# Patient Record
Sex: MALE | Race: WHITE | Employment: OTHER | URBAN - METROPOLITAN AREA
[De-identification: names, ages, dates, MRNs, and addresses within clinical notes are randomized per-mention and may not be internally consistent; named-entity substitution may affect disease eponyms.]

---

## 2022-01-11 ENCOUNTER — APPOINTMENT (OUTPATIENT)
Dept: GENERAL RADIOLOGY | Facility: CLINIC | Age: 67
End: 2022-01-11
Attending: STUDENT IN AN ORGANIZED HEALTH CARE EDUCATION/TRAINING PROGRAM
Payer: COMMERCIAL

## 2022-01-11 ENCOUNTER — HOSPITAL ENCOUNTER (INPATIENT)
Facility: CLINIC | Age: 67
LOS: 3 days | Discharge: HOME | End: 2022-01-15
Attending: EMERGENCY MEDICINE | Admitting: INTERNAL MEDICINE
Payer: COMMERCIAL

## 2022-01-11 DIAGNOSIS — N28.9 RENAL INSUFFICIENCY: ICD-10-CM

## 2022-01-11 DIAGNOSIS — R41.0 CONFUSION ASSOCIATED WITH INFECTION: ICD-10-CM

## 2022-01-11 DIAGNOSIS — B99.9 CONFUSION ASSOCIATED WITH INFECTION: ICD-10-CM

## 2022-01-11 DIAGNOSIS — J12.82 PNEUMONIA DUE TO 2019 NOVEL CORONAVIRUS: ICD-10-CM

## 2022-01-11 DIAGNOSIS — U07.1 PNEUMONIA DUE TO 2019 NOVEL CORONAVIRUS: ICD-10-CM

## 2022-01-11 DIAGNOSIS — R09.02 HYPOXIA: ICD-10-CM

## 2022-01-11 PROBLEM — E11.9 CONTROLLED TYPE 2 DIABETES MELLITUS WITHOUT COMPLICATION, WITHOUT LONG-TERM CURRENT USE OF INSULIN (H): Status: ACTIVE | Noted: 2018-09-10

## 2022-01-11 PROBLEM — N52.9 MALE ERECTILE DYSFUNCTION, UNSPECIFIED: Status: ACTIVE | Noted: 2019-09-17

## 2022-01-11 PROBLEM — Z87.442 HISTORY OF KIDNEY STONES: Status: ACTIVE | Noted: 2020-10-08

## 2022-01-11 PROCEDURE — 85025 COMPLETE CBC W/AUTO DIFF WBC: CPT | Performed by: EMERGENCY MEDICINE

## 2022-01-11 PROCEDURE — 83036 HEMOGLOBIN GLYCOSYLATED A1C: CPT | Performed by: INTERNAL MEDICINE

## 2022-01-11 PROCEDURE — 71045 X-RAY EXAM CHEST 1 VIEW: CPT

## 2022-01-11 PROCEDURE — 36415 COLL VENOUS BLD VENIPUNCTURE: CPT | Performed by: EMERGENCY MEDICINE

## 2022-01-11 PROCEDURE — 99285 EMERGENCY DEPT VISIT HI MDM: CPT | Mod: 25

## 2022-01-11 PROCEDURE — 84145 PROCALCITONIN (PCT): CPT | Performed by: EMERGENCY MEDICINE

## 2022-01-11 PROCEDURE — 87040 BLOOD CULTURE FOR BACTERIA: CPT | Performed by: EMERGENCY MEDICINE

## 2022-01-11 PROCEDURE — C9803 HOPD COVID-19 SPEC COLLECT: HCPCS

## 2022-01-11 PROCEDURE — 85379 FIBRIN DEGRADATION QUANT: CPT | Performed by: INTERNAL MEDICINE

## 2022-01-11 PROCEDURE — 83735 ASSAY OF MAGNESIUM: CPT | Performed by: EMERGENCY MEDICINE

## 2022-01-11 PROCEDURE — 87636 SARSCOV2 & INF A&B AMP PRB: CPT | Performed by: EMERGENCY MEDICINE

## 2022-01-11 PROCEDURE — 86140 C-REACTIVE PROTEIN: CPT | Performed by: INTERNAL MEDICINE

## 2022-01-11 PROCEDURE — 84484 ASSAY OF TROPONIN QUANT: CPT | Performed by: EMERGENCY MEDICINE

## 2022-01-11 PROCEDURE — 85610 PROTHROMBIN TIME: CPT | Performed by: EMERGENCY MEDICINE

## 2022-01-11 PROCEDURE — 82077 ASSAY SPEC XCP UR&BREATH IA: CPT | Performed by: EMERGENCY MEDICINE

## 2022-01-11 PROCEDURE — 80053 COMPREHEN METABOLIC PANEL: CPT | Performed by: EMERGENCY MEDICINE

## 2022-01-11 PROCEDURE — 83605 ASSAY OF LACTIC ACID: CPT | Performed by: EMERGENCY MEDICINE

## 2022-01-11 PROCEDURE — 82803 BLOOD GASES ANY COMBINATION: CPT | Performed by: EMERGENCY MEDICINE

## 2022-01-11 RX ORDER — ACETAMINOPHEN 500 MG
500 TABLET ORAL EVERY 4 HOURS PRN
Status: DISCONTINUED | OUTPATIENT
Start: 2022-01-11 | End: 2022-01-12

## 2022-01-12 ENCOUNTER — APPOINTMENT (OUTPATIENT)
Dept: CT IMAGING | Facility: CLINIC | Age: 67
End: 2022-01-12
Attending: EMERGENCY MEDICINE
Payer: COMMERCIAL

## 2022-01-12 PROBLEM — R09.02 HYPOXIA: Status: ACTIVE | Noted: 2022-01-12

## 2022-01-12 PROBLEM — R41.0 CONFUSION ASSOCIATED WITH INFECTION: Status: ACTIVE | Noted: 2022-01-12

## 2022-01-12 PROBLEM — J12.82 PNEUMONIA DUE TO 2019 NOVEL CORONAVIRUS: Status: ACTIVE | Noted: 2022-01-12

## 2022-01-12 PROBLEM — U07.1 PNEUMONIA DUE TO 2019 NOVEL CORONAVIRUS: Status: ACTIVE | Noted: 2022-01-12

## 2022-01-12 PROBLEM — N28.9 RENAL INSUFFICIENCY: Status: ACTIVE | Noted: 2022-01-12

## 2022-01-12 PROBLEM — B99.9 CONFUSION ASSOCIATED WITH INFECTION: Status: ACTIVE | Noted: 2022-01-12

## 2022-01-12 LAB
ALBUMIN SERPL-MCNC: 3.4 G/DL (ref 3.4–5)
ALP SERPL-CCNC: 56 U/L (ref 40–150)
ALT SERPL W P-5'-P-CCNC: 62 U/L (ref 0–70)
AMPHETAMINES UR QL SCN: NORMAL
ANION GAP SERPL CALCULATED.3IONS-SCNC: 10 MMOL/L (ref 3–14)
ANION GAP SERPL CALCULATED.3IONS-SCNC: 8 MMOL/L (ref 3–14)
ANION GAP SERPL CALCULATED.3IONS-SCNC: 9 MMOL/L (ref 3–14)
ANION GAP SERPL CALCULATED.3IONS-SCNC: 9 MMOL/L (ref 3–14)
AST SERPL W P-5'-P-CCNC: 49 U/L (ref 0–45)
BARBITURATES UR QL: NORMAL
BASE EXCESS BLDV CALC-SCNC: -2.8 MMOL/L (ref -7.7–1.9)
BASOPHILS # BLD AUTO: 0 10E3/UL (ref 0–0.2)
BASOPHILS # BLD AUTO: 0 10E3/UL (ref 0–0.2)
BASOPHILS NFR BLD AUTO: 0 %
BASOPHILS NFR BLD AUTO: 1 %
BENZODIAZ UR QL: NORMAL
BILIRUB SERPL-MCNC: 0.5 MG/DL (ref 0.2–1.3)
BUN SERPL-MCNC: 37 MG/DL (ref 7–30)
BUN SERPL-MCNC: 37 MG/DL (ref 7–30)
BUN SERPL-MCNC: 39 MG/DL (ref 7–30)
BUN SERPL-MCNC: 40 MG/DL (ref 7–30)
CALCIUM SERPL-MCNC: 8.2 MG/DL (ref 8.5–10.1)
CALCIUM SERPL-MCNC: 8.3 MG/DL (ref 8.5–10.1)
CALCIUM SERPL-MCNC: 8.7 MG/DL (ref 8.5–10.1)
CALCIUM SERPL-MCNC: 8.8 MG/DL (ref 8.5–10.1)
CANNABINOIDS UR QL SCN: NORMAL
CHLORIDE BLD-SCNC: 103 MMOL/L (ref 94–109)
CHLORIDE BLD-SCNC: 107 MMOL/L (ref 94–109)
CO2 SERPL-SCNC: 17 MMOL/L (ref 20–32)
CO2 SERPL-SCNC: 20 MMOL/L (ref 20–32)
CO2 SERPL-SCNC: 20 MMOL/L (ref 20–32)
CO2 SERPL-SCNC: 21 MMOL/L (ref 20–32)
COCAINE UR QL: NORMAL
CREAT SERPL-MCNC: 1.65 MG/DL (ref 0.66–1.25)
CREAT SERPL-MCNC: 1.84 MG/DL (ref 0.66–1.25)
CREAT SERPL-MCNC: 1.87 MG/DL (ref 0.66–1.25)
CREAT SERPL-MCNC: 1.95 MG/DL (ref 0.66–1.25)
CRP SERPL-MCNC: 13.1 MG/L (ref 0–8)
D DIMER PPP FEU-MCNC: 0.49 UG/ML FEU (ref 0–0.5)
EOSINOPHIL # BLD AUTO: 0 10E3/UL (ref 0–0.7)
EOSINOPHIL # BLD AUTO: 0 10E3/UL (ref 0–0.7)
EOSINOPHIL NFR BLD AUTO: 0 %
EOSINOPHIL NFR BLD AUTO: 0 %
ERYTHROCYTE [DISTWIDTH] IN BLOOD BY AUTOMATED COUNT: 13 % (ref 10–15)
ERYTHROCYTE [DISTWIDTH] IN BLOOD BY AUTOMATED COUNT: 13.1 % (ref 10–15)
ERYTHROCYTE [DISTWIDTH] IN BLOOD BY AUTOMATED COUNT: 13.2 % (ref 10–15)
ETHANOL SERPL-MCNC: <0.01 G/DL
FLUAV RNA SPEC QL NAA+PROBE: NEGATIVE
FLUBV RNA RESP QL NAA+PROBE: NEGATIVE
GFR SERPL CREATININE-BSD FRML MDRD: 37 ML/MIN/1.73M2
GFR SERPL CREATININE-BSD FRML MDRD: 39 ML/MIN/1.73M2
GFR SERPL CREATININE-BSD FRML MDRD: 40 ML/MIN/1.73M2
GFR SERPL CREATININE-BSD FRML MDRD: 46 ML/MIN/1.73M2
GLUCOSE BLD-MCNC: 117 MG/DL (ref 70–99)
GLUCOSE BLD-MCNC: 117 MG/DL (ref 70–99)
GLUCOSE BLD-MCNC: 217 MG/DL (ref 70–99)
GLUCOSE BLD-MCNC: 242 MG/DL (ref 70–99)
GLUCOSE BLDC GLUCOMTR-MCNC: 142 MG/DL (ref 70–99)
GLUCOSE BLDC GLUCOMTR-MCNC: 182 MG/DL (ref 70–99)
GLUCOSE BLDC GLUCOMTR-MCNC: 200 MG/DL (ref 70–99)
GLUCOSE BLDC GLUCOMTR-MCNC: 245 MG/DL (ref 70–99)
HBA1C MFR BLD: 6.4 % (ref 0–5.6)
HCO3 BLDV-SCNC: 21 MMOL/L (ref 21–28)
HCT VFR BLD AUTO: 39.7 % (ref 40–53)
HCT VFR BLD AUTO: 40.3 % (ref 40–53)
HCT VFR BLD AUTO: 45.3 % (ref 40–53)
HGB BLD-MCNC: 13 G/DL (ref 13.3–17.7)
HGB BLD-MCNC: 13.4 G/DL (ref 13.3–17.7)
HGB BLD-MCNC: 14.4 G/DL (ref 13.3–17.7)
IMM GRANULOCYTES # BLD: 0 10E3/UL
IMM GRANULOCYTES # BLD: 0 10E3/UL
IMM GRANULOCYTES NFR BLD: 0 %
IMM GRANULOCYTES NFR BLD: 1 %
INR PPP: 1.06 (ref 0.85–1.15)
LACTATE SERPL-SCNC: 0.6 MMOL/L (ref 0.7–2)
LACTATE SERPL-SCNC: 1 MMOL/L (ref 0.7–2)
LACTATE SERPL-SCNC: 2.1 MMOL/L (ref 0.7–2)
LYMPHOCYTES # BLD AUTO: 0.4 10E3/UL (ref 0.8–5.3)
LYMPHOCYTES # BLD AUTO: 0.5 10E3/UL (ref 0.8–5.3)
LYMPHOCYTES NFR BLD AUTO: 20 %
LYMPHOCYTES NFR BLD AUTO: 21 %
MAGNESIUM SERPL-MCNC: 2.3 MG/DL (ref 1.6–2.3)
MCH RBC QN AUTO: 31 PG (ref 26.5–33)
MCH RBC QN AUTO: 31.3 PG (ref 26.5–33)
MCH RBC QN AUTO: 31.5 PG (ref 26.5–33)
MCHC RBC AUTO-ENTMCNC: 31.8 G/DL (ref 31.5–36.5)
MCHC RBC AUTO-ENTMCNC: 32.7 G/DL (ref 31.5–36.5)
MCHC RBC AUTO-ENTMCNC: 33.3 G/DL (ref 31.5–36.5)
MCV RBC AUTO: 95 FL (ref 78–100)
MCV RBC AUTO: 95 FL (ref 78–100)
MCV RBC AUTO: 99 FL (ref 78–100)
MONOCYTES # BLD AUTO: 0.1 10E3/UL (ref 0–1.3)
MONOCYTES # BLD AUTO: 0.3 10E3/UL (ref 0–1.3)
MONOCYTES NFR BLD AUTO: 13 %
MONOCYTES NFR BLD AUTO: 6 %
NEUTROPHILS # BLD AUTO: 1.5 10E3/UL (ref 1.6–8.3)
NEUTROPHILS # BLD AUTO: 1.5 10E3/UL (ref 1.6–8.3)
NEUTROPHILS NFR BLD AUTO: 66 %
NEUTROPHILS NFR BLD AUTO: 72 %
NRBC # BLD AUTO: 0 10E3/UL
NRBC # BLD AUTO: 0 10E3/UL
NRBC BLD AUTO-RTO: 0 /100
NRBC BLD AUTO-RTO: 0 /100
O2/TOTAL GAS SETTING VFR VENT: 0 %
OPIATES UR QL SCN: NORMAL
PCO2 BLDV: 35 MM HG (ref 40–50)
PH BLDV: 7.4 [PH] (ref 7.32–7.43)
PLATELET # BLD AUTO: 116 10E3/UL (ref 150–450)
PLATELET # BLD AUTO: 124 10E3/UL (ref 150–450)
PLATELET # BLD AUTO: 146 10E3/UL (ref 150–450)
PO2 BLDV: 53 MM HG (ref 25–47)
POTASSIUM BLD-SCNC: 4 MMOL/L (ref 3.4–5.3)
POTASSIUM BLD-SCNC: 4 MMOL/L (ref 3.4–5.3)
POTASSIUM BLD-SCNC: 4.7 MMOL/L (ref 3.4–5.3)
POTASSIUM BLD-SCNC: 5.4 MMOL/L (ref 3.4–5.3)
POTASSIUM BLD-SCNC: 5.4 MMOL/L (ref 3.4–5.3)
PROCALCITONIN SERPL-MCNC: 0.22 NG/ML
PROT SERPL-MCNC: 6.9 G/DL (ref 6.8–8.8)
RBC # BLD AUTO: 4.2 10E6/UL (ref 4.4–5.9)
RBC # BLD AUTO: 4.26 10E6/UL (ref 4.4–5.9)
RBC # BLD AUTO: 4.6 10E6/UL (ref 4.4–5.9)
RETICS # AUTO: 0.02 10E6/UL (ref 0.03–0.1)
RETICS/RBC NFR AUTO: 0.5 % (ref 0.5–2)
SARS-COV-2 RNA RESP QL NAA+PROBE: POSITIVE
SODIUM SERPL-SCNC: 131 MMOL/L (ref 133–144)
SODIUM SERPL-SCNC: 132 MMOL/L (ref 133–144)
SODIUM SERPL-SCNC: 133 MMOL/L (ref 133–144)
SODIUM SERPL-SCNC: 134 MMOL/L (ref 133–144)
TROPONIN I SERPL HS-MCNC: 16 NG/L
WBC # BLD AUTO: 1.7 10E3/UL (ref 4–11)
WBC # BLD AUTO: 2 10E3/UL (ref 4–11)
WBC # BLD AUTO: 2.4 10E3/UL (ref 4–11)

## 2022-01-12 PROCEDURE — 250N000011 HC RX IP 250 OP 636: Performed by: EMERGENCY MEDICINE

## 2022-01-12 PROCEDURE — 96372 THER/PROPH/DIAG INJ SC/IM: CPT | Mod: 59

## 2022-01-12 PROCEDURE — 85025 COMPLETE CBC W/AUTO DIFF WBC: CPT | Performed by: INTERNAL MEDICINE

## 2022-01-12 PROCEDURE — 96374 THER/PROPH/DIAG INJ IV PUSH: CPT

## 2022-01-12 PROCEDURE — 36415 COLL VENOUS BLD VENIPUNCTURE: CPT | Performed by: EMERGENCY MEDICINE

## 2022-01-12 PROCEDURE — 250N000009 HC RX 250: Performed by: STUDENT IN AN ORGANIZED HEALTH CARE EDUCATION/TRAINING PROGRAM

## 2022-01-12 PROCEDURE — 80048 BASIC METABOLIC PNL TOTAL CA: CPT | Performed by: STUDENT IN AN ORGANIZED HEALTH CARE EDUCATION/TRAINING PROGRAM

## 2022-01-12 PROCEDURE — 85045 AUTOMATED RETICULOCYTE COUNT: CPT | Performed by: INTERNAL MEDICINE

## 2022-01-12 PROCEDURE — 87040 BLOOD CULTURE FOR BACTERIA: CPT | Performed by: EMERGENCY MEDICINE

## 2022-01-12 PROCEDURE — 120N000001 HC R&B MED SURG/OB

## 2022-01-12 PROCEDURE — 96375 TX/PRO/DX INJ NEW DRUG ADDON: CPT

## 2022-01-12 PROCEDURE — 258N000003 HC RX IP 258 OP 636: Performed by: STUDENT IN AN ORGANIZED HEALTH CARE EDUCATION/TRAINING PROGRAM

## 2022-01-12 PROCEDURE — 250N000012 HC RX MED GY IP 250 OP 636 PS 637: Performed by: INTERNAL MEDICINE

## 2022-01-12 PROCEDURE — 93005 ELECTROCARDIOGRAM TRACING: CPT

## 2022-01-12 PROCEDURE — 99223 1ST HOSP IP/OBS HIGH 75: CPT | Mod: AI | Performed by: INTERNAL MEDICINE

## 2022-01-12 PROCEDURE — 80307 DRUG TEST PRSMV CHEM ANLYZR: CPT | Performed by: EMERGENCY MEDICINE

## 2022-01-12 PROCEDURE — 83605 ASSAY OF LACTIC ACID: CPT | Performed by: INTERNAL MEDICINE

## 2022-01-12 PROCEDURE — 80048 BASIC METABOLIC PNL TOTAL CA: CPT | Performed by: INTERNAL MEDICINE

## 2022-01-12 PROCEDURE — 70450 CT HEAD/BRAIN W/O DYE: CPT

## 2022-01-12 PROCEDURE — 250N000013 HC RX MED GY IP 250 OP 250 PS 637: Performed by: EMERGENCY MEDICINE

## 2022-01-12 PROCEDURE — 85014 HEMATOCRIT: CPT | Performed by: INTERNAL MEDICINE

## 2022-01-12 PROCEDURE — 36415 COLL VENOUS BLD VENIPUNCTURE: CPT | Performed by: INTERNAL MEDICINE

## 2022-01-12 PROCEDURE — 36415 COLL VENOUS BLD VENIPUNCTURE: CPT | Performed by: STUDENT IN AN ORGANIZED HEALTH CARE EDUCATION/TRAINING PROGRAM

## 2022-01-12 PROCEDURE — 250N000011 HC RX IP 250 OP 636: Performed by: INTERNAL MEDICINE

## 2022-01-12 PROCEDURE — 83605 ASSAY OF LACTIC ACID: CPT | Performed by: STUDENT IN AN ORGANIZED HEALTH CARE EDUCATION/TRAINING PROGRAM

## 2022-01-12 RX ORDER — BENZONATATE 100 MG/1
100 CAPSULE ORAL 3 TIMES DAILY PRN
Status: DISCONTINUED | OUTPATIENT
Start: 2022-01-12 | End: 2022-01-15 | Stop reason: HOSPADM

## 2022-01-12 RX ORDER — ONDANSETRON 2 MG/ML
4 INJECTION INTRAMUSCULAR; INTRAVENOUS EVERY 6 HOURS PRN
Status: DISCONTINUED | OUTPATIENT
Start: 2022-01-12 | End: 2022-01-15 | Stop reason: HOSPADM

## 2022-01-12 RX ORDER — ACETAMINOPHEN 650 MG/1
650 SUPPOSITORY RECTAL EVERY 6 HOURS PRN
Status: DISCONTINUED | OUTPATIENT
Start: 2022-01-12 | End: 2022-01-15 | Stop reason: HOSPADM

## 2022-01-12 RX ORDER — LANOLIN ALCOHOL/MO/W.PET/CERES
3 CREAM (GRAM) TOPICAL
Status: DISCONTINUED | OUTPATIENT
Start: 2022-01-12 | End: 2022-01-15 | Stop reason: HOSPADM

## 2022-01-12 RX ORDER — DEXTROSE MONOHYDRATE 25 G/50ML
25-50 INJECTION, SOLUTION INTRAVENOUS
Status: DISCONTINUED | OUTPATIENT
Start: 2022-01-12 | End: 2022-01-15 | Stop reason: HOSPADM

## 2022-01-12 RX ORDER — ACETAMINOPHEN 325 MG/1
650 TABLET ORAL EVERY 6 HOURS PRN
Status: DISCONTINUED | OUTPATIENT
Start: 2022-01-12 | End: 2022-01-15 | Stop reason: HOSPADM

## 2022-01-12 RX ORDER — SILDENAFIL 100 MG/1
TABLET, FILM COATED ORAL
COMMUNITY
Start: 2020-09-23

## 2022-01-12 RX ORDER — CODEINE PHOSPHATE AND GUAIFENESIN 10; 100 MG/5ML; MG/5ML
5 SOLUTION ORAL EVERY 4 HOURS PRN
Status: DISCONTINUED | OUTPATIENT
Start: 2022-01-12 | End: 2022-01-15 | Stop reason: HOSPADM

## 2022-01-12 RX ORDER — ONDANSETRON 4 MG/1
4 TABLET, ORALLY DISINTEGRATING ORAL EVERY 6 HOURS PRN
Status: DISCONTINUED | OUTPATIENT
Start: 2022-01-12 | End: 2022-01-15 | Stop reason: HOSPADM

## 2022-01-12 RX ORDER — LOSARTAN POTASSIUM 100 MG/1
100 TABLET ORAL DAILY
Status: ON HOLD | COMMUNITY
Start: 2021-09-22 | End: 2022-01-15

## 2022-01-12 RX ORDER — SODIUM CHLORIDE 9 MG/ML
INJECTION, SOLUTION INTRAVENOUS CONTINUOUS
Status: ACTIVE | OUTPATIENT
Start: 2022-01-12 | End: 2022-01-12

## 2022-01-12 RX ORDER — ALBUTEROL SULFATE 90 UG/1
1-2 AEROSOL, METERED RESPIRATORY (INHALATION) 4 TIMES DAILY PRN
Status: DISCONTINUED | OUTPATIENT
Start: 2022-01-12 | End: 2022-01-15 | Stop reason: HOSPADM

## 2022-01-12 RX ORDER — ALBUTEROL SULFATE 90 UG/1
1-2 AEROSOL, METERED RESPIRATORY (INHALATION) 4 TIMES DAILY PRN
COMMUNITY
Start: 2022-01-04

## 2022-01-12 RX ORDER — AMOXICILLIN 250 MG
1 CAPSULE ORAL 2 TIMES DAILY PRN
Status: DISCONTINUED | OUTPATIENT
Start: 2022-01-12 | End: 2022-01-15 | Stop reason: HOSPADM

## 2022-01-12 RX ORDER — BETAMETHASONE VALERATE 0.1 %
LOTION (ML) TOPICAL
COMMUNITY
Start: 2020-08-03

## 2022-01-12 RX ORDER — LIDOCAINE 40 MG/G
CREAM TOPICAL
Status: DISCONTINUED | OUTPATIENT
Start: 2022-01-12 | End: 2022-01-15 | Stop reason: HOSPADM

## 2022-01-12 RX ORDER — BETAMETHASONE DIPROPIONATE 0.5 MG/G
OINTMENT, AUGMENTED TOPICAL
COMMUNITY
Start: 2021-09-22

## 2022-01-12 RX ORDER — DEXAMETHASONE SODIUM PHOSPHATE 10 MG/ML
10 INJECTION, SOLUTION INTRAMUSCULAR; INTRAVENOUS ONCE
Status: COMPLETED | OUTPATIENT
Start: 2022-01-12 | End: 2022-01-12

## 2022-01-12 RX ORDER — VALACYCLOVIR HYDROCHLORIDE 1 G/1
TABLET, FILM COATED ORAL
COMMUNITY
Start: 2020-08-25

## 2022-01-12 RX ORDER — NICOTINE POLACRILEX 4 MG
15-30 LOZENGE BUCCAL
Status: DISCONTINUED | OUTPATIENT
Start: 2022-01-12 | End: 2022-01-15 | Stop reason: HOSPADM

## 2022-01-12 RX ORDER — MAGNESIUM HYDROXIDE/ALUMINUM HYDROXICE/SIMETHICONE 120; 1200; 1200 MG/30ML; MG/30ML; MG/30ML
30 SUSPENSION ORAL EVERY 4 HOURS PRN
Status: DISCONTINUED | OUTPATIENT
Start: 2022-01-12 | End: 2022-01-15 | Stop reason: HOSPADM

## 2022-01-12 RX ORDER — AMOXICILLIN 250 MG
2 CAPSULE ORAL 2 TIMES DAILY PRN
Status: DISCONTINUED | OUTPATIENT
Start: 2022-01-12 | End: 2022-01-15 | Stop reason: HOSPADM

## 2022-01-12 RX ORDER — CHLORAL HYDRATE 500 MG
2 CAPSULE ORAL
COMMUNITY
Start: 2021-09-22

## 2022-01-12 RX ORDER — CALCIUM GLUCONATE 20 MG/ML
1 INJECTION, SOLUTION INTRAVENOUS ONCE
Status: COMPLETED | OUTPATIENT
Start: 2022-01-12 | End: 2022-01-12

## 2022-01-12 RX ADMIN — CALCIUM GLUCONATE 1 G: 20 INJECTION, SOLUTION INTRAVENOUS at 09:58

## 2022-01-12 RX ADMIN — INSULIN ASPART 3 UNITS: 100 INJECTION, SOLUTION INTRAVENOUS; SUBCUTANEOUS at 14:08

## 2022-01-12 RX ADMIN — DEXAMETHASONE SODIUM PHOSPHATE 10 MG: 10 INJECTION, SOLUTION INTRAMUSCULAR; INTRAVENOUS at 01:55

## 2022-01-12 RX ADMIN — ENOXAPARIN SODIUM 40 MG: 40 INJECTION SUBCUTANEOUS at 06:50

## 2022-01-12 RX ADMIN — SODIUM CHLORIDE: 9 INJECTION, SOLUTION INTRAVENOUS at 10:45

## 2022-01-12 RX ADMIN — INSULIN ASPART 2 UNITS: 100 INJECTION, SOLUTION INTRAVENOUS; SUBCUTANEOUS at 10:41

## 2022-01-12 RX ADMIN — SODIUM CHLORIDE: 9 INJECTION, SOLUTION INTRAVENOUS at 16:26

## 2022-01-12 RX ADMIN — ACETAMINOPHEN 500 MG: 500 TABLET, FILM COATED ORAL at 00:36

## 2022-01-12 RX ADMIN — INSULIN ASPART 1 UNITS: 100 INJECTION, SOLUTION INTRAVENOUS; SUBCUTANEOUS at 18:47

## 2022-01-12 ASSESSMENT — ACTIVITIES OF DAILY LIVING (ADL)
ADLS_ACUITY_SCORE: 6
ADLS_ACUITY_SCORE: 12
ADLS_ACUITY_SCORE: 6
ADLS_ACUITY_SCORE: 12
TOILETING_ISSUES: NO
ADLS_ACUITY_SCORE: 6
ADLS_ACUITY_SCORE: 12
ADLS_ACUITY_SCORE: 12
WALKING_OR_CLIMBING_STAIRS_DIFFICULTY: NO
ADLS_ACUITY_SCORE: 12
ADLS_ACUITY_SCORE: 6
ADLS_ACUITY_SCORE: 6
DIFFICULTY_COMMUNICATING: NO
ADLS_ACUITY_SCORE: 12
ADLS_ACUITY_SCORE: 6
ADLS_ACUITY_SCORE: 6
FALL_HISTORY_WITHIN_LAST_SIX_MONTHS: NO
HEARING_DIFFICULTY_OR_DEAF: NO
ADLS_ACUITY_SCORE: 12
DOING_ERRANDS_INDEPENDENTLY_DIFFICULTY: NO
CONCENTRATING,_REMEMBERING_OR_MAKING_DECISIONS_DIFFICULTY: NO
ADLS_ACUITY_SCORE: 6
PATIENT_/_FAMILY_COMMUNICATION_STYLE: SPOKEN LANGUAGE (ENGLISH OR BILINGUAL)
ADLS_ACUITY_SCORE: 6
ADLS_ACUITY_SCORE: 6
ADLS_ACUITY_SCORE: 12
DRESSING/BATHING_DIFFICULTY: NO
DIFFICULTY_EATING/SWALLOWING: NO
WEAR_GLASSES_OR_BLIND: YES
ADLS_ACUITY_SCORE: 12

## 2022-01-12 NOTE — PROGRESS NOTES
SPIRITUAL HEALTH SERVICES  SPIRITUAL ASSESSMENT Progress Note  [facility] Med[facility] 5     REFERRAL SOURCE: Health Care Directive consult    Delivered [employer] health care directive form to bedside nurse as pt Dustin is COVID+.    PLAN: Spiritual Health remains available should Dustin have questions or require assistance filling out the form.    Joya Cerda  Associate   Phone: [phone]

## 2022-01-12 NOTE — PROGRESS NOTES
Dustin Hudson is a 66 year old male with past medical history significant for type 2 diabetes mellitus, hypertension, chronic kidney disease stage 3, erectile dysfunction and chronic agnosia who was admitted to [facility] on 1/12/2022 with acute hypoxic respiratory failure due to COVID-19 pneumonia. He was admitted earlier this morning by my colleague, Dr. Gómez. At that time he was started on Dexamethasone, but remdesivir was held in the setting of acute kidney injury. Will continue these therapies. He is stable on 4L via NC and does not meet criteria for tocilizumab/baracitinib at this time.     On recheck this morning, BMP revealed increased potassium to 5.4 and increasing creatinine to 1.95. This was verified at recheck. On exam patient did have bibasilar crackles. Heart was regular rate and rhythm. He was awake, alert, oriented x4 and interactive. He appeared slightly volume down. Started low-rate IVF and provided calcium gluconate. ECG to assess possible peaked Twaves pending. Patient changed to low potassium diet. Will recheck BMP this afternoon. If potassium continues to rise, will provide dose of lasix vs Lokelma and consult nephrology.    Wing Gonzales MD  Hospitalist

## 2022-01-12 NOTE — ED PROVIDER NOTES
History   Chief Complaint:  Shortness of Breath     History is limited due to patient confusion history was also obtained from his wife.    HPI   Dustin Hudson is a 66 year old male with history of high cholesterol who presents with low oxygen saturations and confusion.  The patient's wife has been positive for COVID.  The patient reports that 9 days ago he started to get symptomatic.  He reports increased sleep though his sleep has been interrupted and fatigue.  He says intermittently he has been short of breath but that has not been with been waking him up.  The patient said that he has noted his thoughts have not been clear.  He has had a cough as well as fever up to 101.4.  The patient denies vomiting diarrhea or current pain including chest pain.  Per the triage note the patient has oxygen saturations at home and they were in the 80s.    Wife said that she is noted the patient has not been talking as much he seemed confused she consulted family who is a doctor and told him to come to the ER.  There is been no known trauma.  The patient is unvaccinated for COVID.  He denies any recent alcohol or drug use.    Review of Systems  Limited but 10 point review of systems all negative except as in HPI    Allergies:  The patient has no known allergies.     Medications:  Aspirin 81 mg   Valtrex  Viagra  Losartan  Albuterol inhaler    Past Medical History:     Type II diabetes  Agnosia for taste  Kidney stone  Early glenohumeral joint osteoarthritis  Cervical spine degeneration  Rupture of left proximal biceps tendon  Osteoarthritis of left AC joint  Impingement syndrome of both shoulders  Hypertension  Hyperlipidemia    Past Surgical History:    Big toe removal   Tonsillectomy    Family History:    Father: heart disease  Sister: aortic dissection, dementia, depression  Mother: lung and brain cancer    Social History:  The patient presents to the ED alone but is . His PCP is Dr. Black.       Physical  Exam     Patient Vitals for the past 24 hrs:   BP Temp Temp src Pulse Resp SpO2 Weight   01/12/22 0600 96/63 -- -- 65 25 96 % --   01/12/22 0500 113/72 -- -- 76 25 96 % --   01/12/22 0400 123/75 -- -- 72 22 92 % --   01/12/22 0300 128/77 -- -- 75 18 93 % --   01/12/22 0200 130/76 -- -- 89 16 96 % --   01/12/22 0138 -- 99.5  F (37.5  C) Oral -- -- -- --   01/12/22 0130 128/80 -- -- 85 (!) 7 93 % --   01/12/22 0120 117/69 -- -- 89 25 95 % --   01/12/22 0110 -- -- -- 90 24 93 % --   01/12/22 0100 120/74 -- -- 90 11 91 % --   01/12/22 0050 124/77 -- -- 89 16 91 % --   01/12/22 0000 (!) 140/82 -- -- 87 -- 92 % --   01/11/22 2345 135/88 -- -- 93 -- 94 % --   01/11/22 2330 116/74 -- -- 94 -- 90 % --   01/11/22 2315 127/77 -- -- 92 -- (!) 89 % --   01/11/22 2242 126/86 (!) 102.2  F (39  C) Oral 99 20 95 % 93.6 kg (206 lb 5.6 oz)       Physical Exam  General: The patient is alert, in no respiratory distress.    HENT: Mucous membranes moist.  Atraumatic    Cardiovascular: Regular rate and rhythm. Good pulses in all four extremities. Normal capillary refill and skin turgor.     Respiratory: Adequate air movement    Gastrointestinal: Abdomen soft. No guarding, no rebound. No palpable hernias.     Musculoskeletal: No gross deformity.     Skin: No rashes or petechiae.     Neurologic: The patient is alert and oriented to person place month and year.  He could not name the day of the month.  He had 0 out of 3 object recall at 5 minutes.  Adequate strength in his extremities.  No facial droop.  Gross sensation intact    Lymphatic: No cervical adenopathy. No lower extremity swelling.    Psychiatric: The patient is non-tearful.    Emergency Department Course       Imaging:  Head CT w/o contrast   Final Result   IMPRESSION:   1.  No acute intracranial abnormality.      2.  Age-related change.      XR Chest Port 1 View   Final Result   IMPRESSION: Mild patchy groundglass and interstitial opacities are present bilaterally suggesting  pneumonia. Findings would be compatible with COVID. There is no dense localized consolidation. No pleural effusion. Heart size normal.        Report per radiology    Laboratory:  Labs Ordered and Resulted from Time of ED Arrival to Time of ED Departure   BASIC METABOLIC PANEL - Abnormal       Result Value    Sodium 131 (*)     Potassium 4.0      Chloride 103      Carbon Dioxide (CO2) 20      Anion Gap 8      Urea Nitrogen 37 (*)     Creatinine 1.87 (*)     Calcium 8.2 (*)     Glucose 117 (*)     GFR Estimate 39 (*)    PROCALCITONIN - Abnormal    Procalcitonin 0.22 (*)    CBC WITH PLATELETS AND DIFFERENTIAL - Abnormal    WBC Count 2.4 (*)     RBC Count 4.60      Hemoglobin 14.4      Hematocrit 45.3      MCV 99      MCH 31.3      MCHC 31.8      RDW 13.1      Platelet Count 124 (*)     % Neutrophils 66      % Lymphocytes 21      % Monocytes 13      % Eosinophils 0      % Basophils 0      % Immature Granulocytes 0      NRBCs per 100 WBC 0      Absolute Neutrophils 1.5 (*)     Absolute Lymphocytes 0.5 (*)     Absolute Monocytes 0.3      Absolute Eosinophils 0.0      Absolute Basophils 0.0      Absolute Immature Granulocytes 0.0      Absolute NRBCs 0.0     COMPREHENSIVE METABOLIC PANEL - Abnormal    Sodium 132 (*)     Potassium 4.0      Chloride 103      Carbon Dioxide (CO2) 20      Anion Gap 9      Urea Nitrogen 37 (*)     Creatinine 1.84 (*)     Calcium 8.3 (*)     Glucose 117 (*)     Alkaline Phosphatase 56      AST 49 (*)     ALT 62      Protein Total 6.9      Albumin 3.4      Bilirubin Total 0.5      GFR Estimate 40 (*)    LACTIC ACID WHOLE BLOOD - Abnormal    Lactic Acid 0.6 (*)    BLOOD GAS VENOUS - Abnormal    pH Venous 7.40      pCO2 Venous 35 (*)     pO2 Venous 53 (*)     Bicarbonate Venous 21      Base Excess/Deficit (+/-) -2.8      FIO2 0     INFLUENZA A/B & SARS-COV2 PCR MULTIPLEX - Abnormal    Influenza A PCR Negative      Influenza B PCR Negative      SARS CoV2 PCR Positive (*)    CRP INFLAMMATION -  Abnormal    CRP Inflammation 13.1 (*)    HEMOGLOBIN A1C - Abnormal    Hemoglobin A1C 6.4 (*)    CBC WITH PLATELETS AND DIFFERENTIAL - Abnormal    WBC Count 2.0 (*)     RBC Count 4.20 (*)     Hemoglobin 13.0 (*)     Hematocrit 39.7 (*)     MCV 95      MCH 31.0      MCHC 32.7      RDW 13.0      Platelet Count 146 (*)     % Neutrophils 72      % Lymphocytes 20      % Monocytes 6      % Eosinophils 0      % Basophils 1      % Immature Granulocytes 1      NRBCs per 100 WBC 0      Absolute Neutrophils 1.5 (*)     Absolute Lymphocytes 0.4 (*)     Absolute Monocytes 0.1      Absolute Eosinophils 0.0      Absolute Basophils 0.0      Absolute Immature Granulocytes 0.0      Absolute NRBCs 0.0     RETICULOCYTE COUNT - Abnormal    % Reticulocyte 0.5      Absolute Reticulocyte 0.022 (*)    MAGNESIUM - Normal    Magnesium 2.3     TROPONIN I - Normal    Troponin I High Sensitivity 16     INR - Normal    INR 1.06     ETHYL ALCOHOL LEVEL - Normal    Alcohol ethyl <0.01     D DIMER QUANTITATIVE - Normal    D-Dimer Quantitative 0.49     MORPHOLOGY TRACKING   GLUCOSE MONITOR NURSING POCT   GLUCOSE MONITOR NURSING POCT   GLUCOSE MONITOR NURSING POCT   GLUCOSE MONITOR NURSING POCT   GLUCOSE MONITOR NURSING POCT   BASIC METABOLIC PANEL   CBC WITH PLATELETS   DRUG ABUSE SCREEN 1 URINE (ED)   GLUCOSE BY METER POCT   BLOOD MORPHOLOGY PATHOLOGIST REVIEW   BLOOD CULTURE   BLOOD CULTURE   LAB BLOOD MORPHOLOGY PATHOLOGIST REVIEW        Procedures      Emergency Department Course:  Reviewed:  I reviewed nursing notes, vitals, past medical history, Care Everywhere and [facility]    Assessments:   I obtained history and examined the patient as noted above.  I called the wife to discuss the history it appears its been several days ago that his symptoms started   I rechecked the patient and explained findings.       Interventions:  Medications   sodium chloride (PF) 0.9% PF flush 3 mL (has no administration in time range)   sodium chloride (PF) 0.9% PF  flush 3 mL (3 mLs Intracatheter Given 1/12/22 0157)   lidocaine 1 % 0.1-1 mL (has no administration in time range)   lidocaine (LMX4) cream (has no administration in time range)   sodium chloride (PF) 0.9% PF flush 3 mL (3 mLs Intracatheter Not Given 1/12/22 0635)   sodium chloride (PF) 0.9% PF flush 3 mL (has no administration in time range)   acetaminophen (TYLENOL) tablet 650 mg (has no administration in time range)     Or   acetaminophen (TYLENOL) Suppository 650 mg (has no administration in time range)   melatonin tablet 3 mg (has no administration in time range)   senna-docusate (SENOKOT-S/PERICOLACE) 8.6-50 MG per tablet 1 tablet (has no administration in time range)     Or   senna-docusate (SENOKOT-S/PERICOLACE) 8.6-50 MG per tablet 2 tablet (has no administration in time range)   magnesium hydroxide (MILK OF MAGNESIA) suspension 30 mL (has no administration in time range)   ondansetron (ZOFRAN-ODT) ODT tab 4 mg (has no administration in time range)     Or   ondansetron (ZOFRAN) injection 4 mg (has no administration in time range)   alum & mag hydroxide-simethicone (MAALOX) suspension 30 mL (has no administration in time range)   enoxaparin ANTICOAGULANT (LOVENOX) injection 40 mg (40 mg Subcutaneous Given 1/12/22 0650)   dexamethasone (DECADRON) tablet 6 mg (has no administration in time range)   glucose gel 15-30 g (has no administration in time range)     Or   dextrose 50 % injection 25-50 mL (has no administration in time range)     Or   glucagon injection 1 mg (has no administration in time range)   insulin aspart (NovoLOG) injection (RAPID ACTING) (has no administration in time range)   insulin aspart (NovoLOG) injection (RAPID ACTING) (1 Units Subcutaneous Not Given 1/12/22 0736)   guaiFENesin-codeine (ROBITUSSIN AC) 100-10 MG/5ML solution 5 mL (has no administration in time range)   benzonatate (TESSALON) capsule 100 mg (has no administration in time range)   dexamethasone PF (DECADRON) injection 10  mg (10 mg Intravenous Given 1/12/22 0155)         Disposition:  The patient was admitted to the hospital under the care of Dr. Gómez.     Impression & Plan         Medical Decision Making:  With the patient's exposure to COVID and his complaints of low oxygen saturation to the 80s I was concerned that this is COVID however also considered PE sepsis pneumonia arrhythmia ACS hypoxia amongst many other conditions.  Alcohol or drugs could be involved as well.  Stroke would be a possibility as well.  In talking to his wife he is outside the window for tPA.  I did order a head CT labs as above.  The patient has intermittently gone low on his oxygen sats and is confused and therefore require admission.  The patient did not show signs of stroke but had hypoxia requiring oxygen.  He was started on dexamethasone and came back positive for COVID.    Critical Care Time: was 45 minutes for this patient excluding procedures    Diagnosis:    ICD-10-CM    1. Hypoxia  R09.02    2. Pneumonia due to 2019 novel coronavirus  U07.1     J12.82    3. Confusion associated with infection  B99.9     R41.0    4. Renal insufficiency  N28.9        Scribe Disclosure:  I, Alaina Anthony, am serving as a scribe at 11:01 PM on 1/11/2022 to document services personally performed by Joaquin Castorena MD based on my observations and the provider's statements to me.              Joaquin Castorena MD  01/12/22 3835

## 2022-01-12 NOTE — PROGRESS NOTES
Patient maintaining oxygen sats on 4 liters via NC. Education provided on diabetes and use of insulin during inpatient stay. Wife updated via phone.       Wife Itzel appreciates updates. Cell phone [phone]

## 2022-01-12 NOTE — ED NOTES
[facility]  ED Nurse Handoff Report    Dustin Hudson is a 66 year old male   ED Chief complaint: Shortness of Breath  . ED Diagnosis:   Final diagnoses:   None     Allergies: No Known Allergies    Code Status: Full Code  Activity level - Baseline/Home:  Independent. Activity Level - Current:   Stand by Assist. Lift room needed: No. Bariatric: No   Needed: No   Isolation: Yes. Infection: Not Applicable  COVID r/o and special precautions.     Vital Signs:   Vitals:    01/12/22 0000 01/12/22 0050 01/12/22 0100 01/12/22 0110   BP: (!) 140/82 124/77 120/74    Pulse: 87 89 90 90   Resp:  16 11 24   Temp:       TempSrc:       SpO2: 92% 91% 91% 93%   Weight:           Cardiac Rhythm:  ,      Pain level:    Patient confused: Yes. Patient Falls Risk: No.   Elimination Status: Has voided   Patient Report - Initial Complaint: Shortness of Breath. Focused Assessment: Dustin Hudson is a 66 year old male with history of high cholesterol who presents with low oxygen saturations and confusion.  The patient's wife has been positive for COVID.  The patient reports that 9 days ago he started to get symptomatic.  He reports increased sleep though his sleep has been interrupted and fatigue.  He says intermittently he has been short of breath but that has not been with been waking him up.  The patient said that he has noted his thoughts have not been clear.  He has had a cough as well as fever up to 101.4.  The patient denies vomiting diarrhea or current pain including chest pain.  Per the triage note the patient has oxygen saturations at home and they were in the 80s.     Wife said that she is noted the patient has not been talking as much he seemed confused she consulted family who is a doctor and told him to come to the ER.  There is been no known trauma.  The patient is unvaccinated for COVID.  He denies any recent alcohol or drug use.      Tests Performed: EKG, Head CT, chest x-ray,  labs. Abnormal Results:   Labs Ordered and Resulted from Time of ED Arrival to Time of ED Departure   BASIC METABOLIC PANEL - Abnormal       Result Value    Sodium 131 (*)     Potassium 4.0      Chloride 103      Carbon Dioxide (CO2) 20      Anion Gap 8      Urea Nitrogen 37 (*)     Creatinine 1.87 (*)     Calcium 8.2 (*)     Glucose 117 (*)     GFR Estimate 39 (*)    CBC WITH PLATELETS AND DIFFERENTIAL - Abnormal    WBC Count 2.4 (*)     RBC Count 4.60      Hemoglobin 14.4      Hematocrit 45.3      MCV 99      MCH 31.3      MCHC 31.8      RDW 13.1      Platelet Count 124 (*)     % Neutrophils 66      % Lymphocytes 21      % Monocytes 13      % Eosinophils 0      % Basophils 0      % Immature Granulocytes 0      NRBCs per 100 WBC 0      Absolute Neutrophils 1.5 (*)     Absolute Lymphocytes 0.5 (*)     Absolute Monocytes 0.3      Absolute Eosinophils 0.0      Absolute Basophils 0.0      Absolute Immature Granulocytes 0.0      Absolute NRBCs 0.0     COMPREHENSIVE METABOLIC PANEL - Abnormal    Sodium 132 (*)     Potassium 4.0      Chloride 103      Carbon Dioxide (CO2) 20      Anion Gap 9      Urea Nitrogen 37 (*)     Creatinine 1.84 (*)     Calcium 8.3 (*)     Glucose 117 (*)     Alkaline Phosphatase 56      AST 49 (*)     ALT 62      Protein Total 6.9      Albumin 3.4      Bilirubin Total 0.5      GFR Estimate 40 (*)    BLOOD GAS VENOUS - Abnormal    pH Venous 7.40      pCO2 Venous 35 (*)     pO2 Venous 53 (*)     Bicarbonate Venous 21      Base Excess/Deficit (+/-) -2.8      FIO2 0     MAGNESIUM - Normal    Magnesium 2.3     TROPONIN I - Normal    Troponin I High Sensitivity 16     INR - Normal    INR 1.06     ETHYL ALCOHOL LEVEL - Normal    Alcohol ethyl <0.01     INFLUENZA A/B & SARS-COV2 PCR MULTIPLEX   PROCALCITONIN   LACTIC ACID WHOLE BLOOD   INFLUENZA A/B & SARS-COV2 PCR MULTIPLEX   BLOOD CULTURE   BLOOD CULTURE      Head CT w/o contrast   Final Result   IMPRESSION:   1.  No acute intracranial abnormality.       2.  Age-related change.      XR Chest Port 1 View   Final Result   IMPRESSION: Mild patchy groundglass and interstitial opacities are present bilaterally suggesting pneumonia. Findings would be compatible with COVID. There is no dense localized consolidation. No pleural effusion. Heart size normal.      .   Treatments provided: see emar  Family Comments: None  OBS brochure/video discussed/provided to patient:  N/A  ED Medications:   Medications   acetaminophen (TYLENOL) tablet 500 mg (500 mg Oral Given 1/12/22 0036)   sodium chloride (PF) 0.9% PF flush 3 mL (has no administration in time range)   sodium chloride (PF) 0.9% PF flush 3 mL (has no administration in time range)     Drips infusing:  No  For the majority of the shift, the patient's behavior Green. Interventions performed were none.    Sepsis treatment initiated:   Yes    Per the ED Provider, Time Zero for severe sepsis or septic shock is:      3 Hour Severe Sepsis Bundle Completion:  1. Initial Lactic Acid Result: No lab results found.  2. Blood Cultures before Antibiotics: Yes  3. Broad Spectrum Antibiotics Administered:     Anti-infectives (From now, onward)    None        4. 0 ml of IV fluids have been given so far      6 Hour Severe Sepsis Bundle Completion:    1. Repeat Lactic Acid Level: Last result No results found for: LACT  2. Patient currently on Vasopressors =  No     Patient tested for COVID 19 prior to admission: YES    ED Nurse Name/Phone Number: Sandro Kilgore RN,   1:26 AM  RECEIVING UNIT ED HANDOFF REVIEW    Above ED Nurse Handoff Report was reviewed: Yes  Reviewed by: Vonnie Flynn RN on January 12, 2022 at 12:53 PM

## 2022-01-12 NOTE — PLAN OF CARE
End of Shift Summary  For vital signs and complete assessments, please see documentation flowsheets.     Pertinent assessments: A&O x4, some intermittent confusion noted. Up SBA in room. Ambulated to bathroom. Fair appetite upon arrival to unit. Fluids encouraged as IVF discontinued. O2 stable on 4L NC, afebrile, VSS.  LS diminished.     Major Shift Events: Pt arrived to unit at 1315. Sepsis protocol triggered, STAT lactic resulted at 2.1, MD notified.     Treatment Plan: Monitor BG, wean O2 as able.     Bedside Nurse: Vonnie Flynn RN

## 2022-01-12 NOTE — H&P
History and Physical     Dustin Hudson MRN# [id number]   YOB: 1955 Age: 66 year old      Date of Admission:  1/11/2022  Date of Service:  1/12/2022    Primary care provider: No primary care provider on file.          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    Wife is positive for COVID 19 infection approximately 10 days pta.  He began having sx around that time as well.  Mostly dry cough/fever, he denies sob, and has long standing agnosia from a TBI many years ago.  He denies any diarrhea.  He was brought in to the ER due to confusion, he admits to feeling disoriented but can't tell me for how long.    He denies any allergy to vaccines, but he is not vaccinated against COVID    In the ED sats 89 % on RA, febrile at 102.2, other VSS.    Labs with CMP showing ERICK on CKD with bun/creat 37/1.87,  leukopenia 2.4 with lymphopenia, platelets also low at 124    COVID swab positive as expected.  Influenza negative    Problem List:   Acute hypoxic respiratory failure   Acute COVID 19 pneumonia  Unvaccinated, sx began 1/1/22  -dexamethasone 6 mg per day for total of 10 days  -given ERICK on CKD and duration of illness will not give remdesivir  -if declines may be candidate for baricitinib or tociluzimab  -supplemental O2:  NC->FM-HFNC->BiPAP  -self proning  -IS/robitussin ac/tessalon perles  -enox 40 mg qd    Bicytopenia with leukopenia and thrombocytopenia  I have no comparison available in [facility] or care everywhere so difficult to know if acute/subacute/chronic.  I suspect the leukopenia is acute and related to viral pna.  -check peripheral smear     ERICK on CKD  Baseline creat typically in the 1.4-1.5 range, and 1.9 on admission.  Bun/creat ratio 20 suggesting  prerenal azotemia.  Would avoid IVF in the setting of COVID infection  -hold ARB, recheck BMP in the am     Minimally elevated  AST 2.2 viral illness, etoh undetectable    htn  pta on losartan 100 mg every day, would resume when renal function known to be stable      T2dm not on any medications at the moment  -monitor with ISS    DVT Prophylaxis: Heparin SQ  Code Status: Full Code  Functional Status: independent  Carter:not needed  Access:  PIV          We are operating under sub optimal conditions in the setting of a world wide pandemic, hospitals are running at full capacity with limited ICU bed availability. We are providing the best possible patient care with limited resources.           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    Wife is positive for COVID 19 infection approximately 10 days pta.  He began having sx around that time as well.  Mostly dry cough/fever, he denies sob, and has long standing agnosia from a TBI many years ago.  He denies any diarrhea.  He was brought in to the ER due to confusion, he admits to feeling disoriented but can't tell me for how long.    He denies any allergy to vaccines, but he is not vaccinated against COVID    In the ED sats 89 % on RA, febrile at 102.2, other VSS.    Labs with CMP showing ERICK on CKD with bun/creat 37/1.87,  leukopenia 2.4 with lymphopenia, platelets also low at 124    COVID swab positive as expected.  Influenza negative    The history is obtained in discussion with the ER provider Dr Rayray Castorena and the patient with limited reliability, he is quite reluctant to offer up additional information.    [facility] and Care everywhere were extensively reviewed        Past Medical History:     Past Medical  History:   Diagnosis Date     CKD (chronic kidney disease) stage 3, GFR 30-59 ml/min (H)      ED (erectile dysfunction)      Essential hypertension      Nephrolithiasis      Type 2 diabetes mellitus (H)              Past Surgical History:     Past Surgical History:   Procedure Laterality Date     TONSILLECTOMY               Social History:     Social History     Tobacco Use     Smoking status: Former Smoker     Packs/day: 1.50     Years: 17.00     Pack years: 25.50     Types: Cigarettes     Smokeless tobacco: Not on file   Substance Use Topics     Alcohol use: Yes     Comment: 3/week             Family History:     Family History   Problem Relation Age of Onset     Lung Cancer Mother      Coronary Artery Disease Father      Dementia Sister      Brain Cancer Maternal Grandmother      Deep Vein Thrombosis (DVT) Maternal Grandfather             Allergies:   No Known Allergies          Medications:     Losartan           Review of Systems:     A Comprehensive greater than 10 system review of systems was carried out.  Pertinent positives and negatives are noted above.  Otherwise negative for contributory information.           Physical Exam:   Blood pressure 128/80, pulse 85, temperature 99.5  F (37.5  C), temperature source Oral, resp. rate (!) 7, weight 93.6 kg (206 lb 5.6 oz), SpO2 93 %.  Exam:    General:  Pleasant nad looks stated age  HEENT:  Head nc/at sclera clear PERRL O/P:  Mask in place  Neck is supple  Lungs: cta b nl effort   CV:  RRR no m/r/g no le edema  Abd:  S/nt/nd no r/g  Neuro:  Cn 2-12 grossly intact and mcconnell   Alert and oriented to place, but slow with responses, not clear what his baseline is  affect flat, poor eye contact  Skin:  W/d no c/c               Data:          Lab Results   Component Value Date     01/11/2022     01/11/2022    Lab Results   Component Value Date    CHLORIDE 103 01/11/2022    CHLORIDE 103 01/11/2022    Lab Results   Component Value Date    BUN 37 01/11/2022     BUN 37 01/11/2022      Lab Results   Component Value Date    POTASSIUM 4.0 01/11/2022    POTASSIUM 4.0 01/11/2022    Lab Results   Component Value Date    CO2 20 01/11/2022    CO2 20 01/11/2022    Lab Results   Component Value Date    CR 1.87 01/11/2022    CR 1.84 01/11/2022        Lab Results   Component Value Date    WBC 2.4 (L) 01/11/2022    HGB 14.4 01/11/2022    HCT 45.3 01/11/2022    MCV 99 01/11/2022     (L) 01/11/2022     Lab Results   Component Value Date    GFRESTIMATED 39 (L) 01/11/2022    GFRESTIMATED 40 (L) 01/11/2022     Lab Results   Component Value Date    AST 49 (H) 01/11/2022    ALT 62 01/11/2022    ALKPHOS 56 01/11/2022    BILITOTAL 0.5 01/11/2022     Lab Results   Component Value Date    INR 1.06 01/11/2022            Imaging:     Recent Results (from the past 24 hour(s))   XR Chest Port 1 View    Narrative    EXAM: XR CHEST PORT 1 VIEW  LOCATION: [facility]  DATE/TIME: 1/11/2022 11:57 PM    INDICATION: suspected COVID v. influenza v. PNA. Shortness of breath and hypoxia. Cough and fever.  COMPARISON: None.      Impression    IMPRESSION: Mild patchy groundglass and interstitial opacities are present bilaterally suggesting pneumonia. Findings would be compatible with COVID. There is no dense localized consolidation. No pleural effusion. Heart size normal.   Head CT w/o contrast    Narrative    EXAM: CT HEAD W/O CONTRAST  LOCATION: [facility]  DATE/TIME: 1/12/2022 12:23 AM    INDICATION: Mental status change, unknown cause, confusion  COMPARISON: None.  TECHNIQUE: Routine CT Head without IV contrast. Multiplanar reformats. Dose reduction techniques were used.    FINDINGS:  INTRACRANIAL CONTENTS: No intracranial hemorrhage, extraaxial collection, or mass effect.  No CT evidence of acute infarct. Mild volume loss and presumed chronic small vessel ischemia.     VISUALIZED ORBITS/SINUSES/MASTOIDS: No intraorbital abnormality. Trace paranasal sinus  mucosal disease. No middle ear or mastoid effusion.    BONES/SOFT TISSUES: No acute abnormality.      Impression    IMPRESSION:  1.  No acute intracranial abnormality.    2.  Age-related change.        Mirvaso Pregnancy And Lactation Text: This medication has not been assigned a Pregnancy Risk Category. It is unknown if the medication is excreted in breast milk.

## 2022-01-12 NOTE — ED TRIAGE NOTES
Pt arrives to the ED due to increased SOB over past week. Wife tested positive for COVID 2 wks ago. Pt is not vaccinated. Pt states feeling fatigued and weak. States O2 levels at home were in the 80's. Denies chest pain.

## 2022-01-12 NOTE — PROVIDER NOTIFICATION
DATE:  1/12/2022   TIME OF RECEIPT FROM LAB:  4086  LAB TEST:  Lactic Acid   LAB VALUE: 2.1  RESULTS GIVEN WITH READ-BACK TO (PROVIDER):  Dr. Gonzales  TIME LAB VALUE REPORTED TO PROVIDER:   6593

## 2022-01-13 LAB
ANION GAP SERPL CALCULATED.3IONS-SCNC: 6 MMOL/L (ref 3–14)
BUN SERPL-MCNC: 39 MG/DL (ref 7–30)
CALCIUM SERPL-MCNC: 8.8 MG/DL (ref 8.5–10.1)
CHLORIDE BLD-SCNC: 105 MMOL/L (ref 94–109)
CO2 SERPL-SCNC: 23 MMOL/L (ref 20–32)
CREAT SERPL-MCNC: 1.5 MG/DL (ref 0.66–1.25)
CREAT SERPL-MCNC: 1.5 MG/DL (ref 0.66–1.25)
CRP SERPL-MCNC: 10.2 MG/L (ref 0–8)
D DIMER PPP FEU-MCNC: 0.47 UG/ML FEU (ref 0–0.5)
ERYTHROCYTE [DISTWIDTH] IN BLOOD BY AUTOMATED COUNT: 12.8 % (ref 10–15)
GFR SERPL CREATININE-BSD FRML MDRD: 51 ML/MIN/1.73M2
GFR SERPL CREATININE-BSD FRML MDRD: 51 ML/MIN/1.73M2
GLUCOSE BLD-MCNC: 166 MG/DL (ref 70–99)
GLUCOSE BLDC GLUCOMTR-MCNC: 139 MG/DL (ref 70–99)
GLUCOSE BLDC GLUCOMTR-MCNC: 146 MG/DL (ref 70–99)
GLUCOSE BLDC GLUCOMTR-MCNC: 155 MG/DL (ref 70–99)
GLUCOSE BLDC GLUCOMTR-MCNC: 167 MG/DL (ref 70–99)
GLUCOSE BLDC GLUCOMTR-MCNC: 196 MG/DL (ref 70–99)
HCT VFR BLD AUTO: 42.8 % (ref 40–53)
HGB BLD-MCNC: 14.5 G/DL (ref 13.3–17.7)
MCH RBC QN AUTO: 31.5 PG (ref 26.5–33)
MCHC RBC AUTO-ENTMCNC: 33.9 G/DL (ref 31.5–36.5)
MCV RBC AUTO: 93 FL (ref 78–100)
PATH REPORT.COMMENTS IMP SPEC: NORMAL
PATH REPORT.COMMENTS IMP SPEC: NORMAL
PATH REPORT.FINAL DX SPEC: NORMAL
PATH REPORT.MICROSCOPIC SPEC OTHER STN: NORMAL
PATH REPORT.MICROSCOPIC SPEC OTHER STN: NORMAL
PATH REPORT.RELEVANT HX SPEC: NORMAL
PLATELET # BLD AUTO: 199 10E3/UL (ref 150–450)
PLATELET # BLD AUTO: 199 10E3/UL (ref 150–450)
POTASSIUM BLD-SCNC: 4.3 MMOL/L (ref 3.4–5.3)
RBC # BLD AUTO: 4.6 10E6/UL (ref 4.4–5.9)
SODIUM SERPL-SCNC: 134 MMOL/L (ref 133–144)
WBC # BLD AUTO: 6.7 10E3/UL (ref 4–11)

## 2022-01-13 PROCEDURE — 85060 BLOOD SMEAR INTERPRETATION: CPT | Performed by: PATHOLOGY

## 2022-01-13 PROCEDURE — 85379 FIBRIN DEGRADATION QUANT: CPT | Performed by: INTERNAL MEDICINE

## 2022-01-13 PROCEDURE — 86140 C-REACTIVE PROTEIN: CPT | Performed by: INTERNAL MEDICINE

## 2022-01-13 PROCEDURE — 120N000001 HC R&B MED SURG/OB

## 2022-01-13 PROCEDURE — 99231 SBSQ HOSP IP/OBS SF/LOW 25: CPT | Performed by: STUDENT IN AN ORGANIZED HEALTH CARE EDUCATION/TRAINING PROGRAM

## 2022-01-13 PROCEDURE — 82310 ASSAY OF CALCIUM: CPT | Performed by: STUDENT IN AN ORGANIZED HEALTH CARE EDUCATION/TRAINING PROGRAM

## 2022-01-13 PROCEDURE — 85027 COMPLETE CBC AUTOMATED: CPT | Performed by: STUDENT IN AN ORGANIZED HEALTH CARE EDUCATION/TRAINING PROGRAM

## 2022-01-13 PROCEDURE — 36415 COLL VENOUS BLD VENIPUNCTURE: CPT | Performed by: INTERNAL MEDICINE

## 2022-01-13 PROCEDURE — 250N000011 HC RX IP 250 OP 636: Performed by: INTERNAL MEDICINE

## 2022-01-13 RX ADMIN — ENOXAPARIN SODIUM 40 MG: 40 INJECTION SUBCUTANEOUS at 05:28

## 2022-01-13 RX ADMIN — INSULIN ASPART 1 UNITS: 100 INJECTION, SOLUTION INTRAVENOUS; SUBCUTANEOUS at 14:10

## 2022-01-13 RX ADMIN — DEXAMETHASONE 6 MG: 2 TABLET ORAL at 08:36

## 2022-01-13 RX ADMIN — INSULIN ASPART 2 UNITS: 100 INJECTION, SOLUTION INTRAVENOUS; SUBCUTANEOUS at 17:33

## 2022-01-13 ASSESSMENT — ACTIVITIES OF DAILY LIVING (ADL)
ADLS_ACUITY_SCORE: 6

## 2022-01-13 NOTE — PLAN OF CARE
For vital signs and complete assessments, please see documentation flowsheets.     Pertinent assessments: A&Ox4. BP elevated, other VSS. Weaned O2 to 3 L NC, monitoring O2 sats via continuous pulse ox. LS diminished, denies SOB.   Major shift events: triggered sepsis protocol, lactic result: 1.0  IVF discontinued.  Treatment Plan: Follow labs, wean O2 as tolerated.  Bedside Nurse: Rula Hyman RN

## 2022-01-13 NOTE — PROGRESS NOTES
[facility]    Medicine Progress Note - Hospitalist Service  Date of Admission: 1/11/2022     Assessment & Plan         Dustin Hudson is a 66 year old male with past medical history significant for type 2 diabetes mellitus, hypertension, chronic kidney disease stage 3, erectile dysfunction and chronic agnosia who presented to [facility] on 1/11/2022 with acute hypoxic respiratory failure 2/2 COVID-19 pneumonia.     Acute Hypoxic Respiratory Failure  COVID-19 Pneumonia  Patient was unvaccinated for COVID-19.  Developed symptoms on 1/1/2022 with positive test 1/4/2022.  Presented to care on 1/11/2022 with progressive shortness of breath and weakness at home.  Requiring supplemental oxygen up to 4 L.  Dexamethasone initiated on admission, however remdesivir was held in the setting of acute on chronic kidney dysfunction.  CRP 10.2 this morning with decreasing oxygen requirements; does not qualify for Tocilizumab/Baricitinib.  -Dexamethasone 6mg x10 days total  -Holding Remdesivir  -Continuous oximetry  -Supplemental oxygen as needed  -SQH    Acute on Chronic Kidney Dysfunction, resolved  Hyperkalemia, resolved  Baseline creatinine 1.4-1.5.  Admitted with creatinine 1.8-1.9 and hyperkalemia to 5.4.  Suspected to be related to prerenal injury in the setting of poor oral intake prior to arrival.  Both are now downtrending after gentle fluid resuscitation.     Acute Leukopenia, resolved  Acute Thrombocytopenia, resolved  Bicytopenia related to COVID-19 infection, both of which have resolved for initiation of steroid therapy.    Hypertension  Losartan 100 mg PTA, held on admission due to renal dysfunction.  Will restart tomorrow if creatinine remains stable.    Type 2 Diabetes Mellitus: Continue SSI    Diet: Orders Placed This Encounter      Combination Diet Regular Diet Adult; Renal Diet  DVT Prophylaxis: Heparin SQ  Carter Catheter: Not present  Acute hypoxic respiratory  "failure  COVID-19 pneumonia code Status: Full Code    Expected discharge: Consider discharge tomorrow if oxygen needs remained stable over the next 24 hours.    The patient's care was discussed with the Bedside Nurse and Patient.    Wing Gonzales MD, S  Hospitalist Service  [facility]    Securely message with the Vocera Web Console (learn more here)  Text page via [employer] Paging/Directory    ______________________________________________________________________    Interval History   Nursing notes reviewed; no acute events overnight.  Patient reports that he is feeling better this morning and that his breathing is improved significantly since arrival.  He also feels much less fatigue/malaise than on admission.  No subjective fever, chills, nausea, vomiting, diarrhea.    A full 10+ point review of systems was performed and found to be negative with the exception of those items noted here.    Physical Exam   Vital signs:  Temp: 98.7  F (37.1  C) Temp src: Oral BP: 116/77 Pulse: 76   Resp: 20 SpO2: 93 % O2 Device: Nasal cannula Oxygen Delivery: 1.5 LPM Height: 172.7 cm (5' 8\") Weight: 93.6 kg (206 lb 5.6 oz)  Estimated body mass index is 31.38 kg/m  as calculated from the following:    Height as of this encounter: 1.727 m (5' 8\").    Weight as of this encounter: 93.6 kg (206 lb 5.6 oz).    General: Very pleasant male resting comfortably in hospital bed.  Awake, alert, interactive.  HEENT: Normocephalic, atraumatic.  PERRL, EOMI.  Conjunctiva clear, sclerae anicteric.  Mucous membranes moist.  Cardiac: Regular rate and rhythm without murmur, gallop, or rub.  No peripheral edema.  Respiratory: Normal work of breathing.  Nasal cannula in place.  Bibasilar rales similar to prior.  GI: Normal, active bowel sounds.  Abdomen soft, nontender, nondistended.  : Deferred.  Musculoskeletal: Moving all extremities appropriately.  Skin: No rashes or abrasions on exposed skin.  Neurologic: Alert and oriented x4.  " Cranial nerves II through XII grossly intact.  Psychologic: Appropriate mood and affect.    Data   All laboratory results and other diagnostic data from the past 24 hours is available in Epic and has been personally reviewed.    Recent Labs   Lab 01/13/22  1407 01/13/22  0850 01/13/22  0756 01/12/22  1740 01/12/22  1341 01/12/22  0956 01/12/22  0842 01/12/22  0502 01/11/22  2344   WBC  --  6.7  --   --   --   --  1.7* 2.0* 2.4*   HGB  --  14.5  --   --   --   --  13.4 13.0* 14.4   MCV  --  93  --   --   --   --  95 95 99   PLT  --  199  199  --   --   --   --  116* 146* 124*   INR  --   --   --   --   --   --   --   --  1.06   NA  --  134  --   --  134  --  133  --  132*  131*   POTASSIUM  --  4.3  --   --  4.7  --  5.4*  5.4*  --  4.0  4.0   CHLORIDE  --  105  --   --  107  --  103  --  103  103   CO2  --  23  --   --  17*  --  21  --  20  20   BUN  --  39*  --   --  40*  --  39*  --  37*  37*   CR  --  1.50*  1.50*  --   --  1.65*  --  1.95*  --  1.84*  1.87*   ANIONGAP  --  6  --   --  10  --  9  --  9  8   JEZ  --  8.8  --   --  8.8  --  8.7  --  8.3*  8.2*   * 166* 139*   < > 242*   < > 217*  --  117*  117*   ALBUMIN  --   --   --   --   --   --   --   --  3.4   PROTTOTAL  --   --   --   --   --   --   --   --  6.9   BILITOTAL  --   --   --   --   --   --   --   --  0.5   ALKPHOS  --   --   --   --   --   --   --   --  56   ALT  --   --   --   --   --   --   --   --  62   AST  --   --   --   --   --   --   --   --  49*    < > = values in this interval not displayed.     No results found for this or any previous visit (from the past 24 hour(s)).  I personally reviewed: no images or EKG's today.

## 2022-01-13 NOTE — PLAN OF CARE
Pertinent assessments: up SBA, A&O, infrequent cough, LS dim, on 3 LPM NC, SBP 150s, denies SOB    Major Shift Events: uneventful     Treatment Plan: Follow labs, wean O2 as tolerated, decadron, lovenox

## 2022-01-14 LAB
ALBUMIN SERPL-MCNC: 3.1 G/DL (ref 3.4–5)
ALP SERPL-CCNC: 55 U/L (ref 40–150)
ALT SERPL W P-5'-P-CCNC: 57 U/L (ref 0–70)
ANION GAP SERPL CALCULATED.3IONS-SCNC: 5 MMOL/L (ref 3–14)
AST SERPL W P-5'-P-CCNC: 37 U/L (ref 0–45)
BILIRUB SERPL-MCNC: 0.6 MG/DL (ref 0.2–1.3)
BUN SERPL-MCNC: 36 MG/DL (ref 7–30)
CALCIUM SERPL-MCNC: 8.6 MG/DL (ref 8.5–10.1)
CHLORIDE BLD-SCNC: 105 MMOL/L (ref 94–109)
CO2 SERPL-SCNC: 25 MMOL/L (ref 20–32)
CREAT SERPL-MCNC: 1.42 MG/DL (ref 0.66–1.25)
CRP SERPL-MCNC: 12.3 MG/L (ref 0–8)
D DIMER PPP FEU-MCNC: 0.38 UG/ML FEU (ref 0–0.5)
GFR SERPL CREATININE-BSD FRML MDRD: 54 ML/MIN/1.73M2
GLUCOSE BLD-MCNC: 110 MG/DL (ref 70–99)
GLUCOSE BLDC GLUCOMTR-MCNC: 112 MG/DL (ref 70–99)
GLUCOSE BLDC GLUCOMTR-MCNC: 137 MG/DL (ref 70–99)
GLUCOSE BLDC GLUCOMTR-MCNC: 146 MG/DL (ref 70–99)
GLUCOSE BLDC GLUCOMTR-MCNC: 208 MG/DL (ref 70–99)
GLUCOSE BLDC GLUCOMTR-MCNC: 217 MG/DL (ref 70–99)
POTASSIUM BLD-SCNC: 4.3 MMOL/L (ref 3.4–5.3)
PROT SERPL-MCNC: 6.8 G/DL (ref 6.8–8.8)
SODIUM SERPL-SCNC: 135 MMOL/L (ref 133–144)

## 2022-01-14 PROCEDURE — 250N000011 HC RX IP 250 OP 636: Performed by: INTERNAL MEDICINE

## 2022-01-14 PROCEDURE — 80053 COMPREHEN METABOLIC PANEL: CPT | Performed by: STUDENT IN AN ORGANIZED HEALTH CARE EDUCATION/TRAINING PROGRAM

## 2022-01-14 PROCEDURE — 86140 C-REACTIVE PROTEIN: CPT | Performed by: INTERNAL MEDICINE

## 2022-01-14 PROCEDURE — 85379 FIBRIN DEGRADATION QUANT: CPT | Performed by: INTERNAL MEDICINE

## 2022-01-14 PROCEDURE — 120N000001 HC R&B MED SURG/OB

## 2022-01-14 PROCEDURE — 36415 COLL VENOUS BLD VENIPUNCTURE: CPT | Performed by: INTERNAL MEDICINE

## 2022-01-14 PROCEDURE — 82040 ASSAY OF SERUM ALBUMIN: CPT | Performed by: STUDENT IN AN ORGANIZED HEALTH CARE EDUCATION/TRAINING PROGRAM

## 2022-01-14 PROCEDURE — 99231 SBSQ HOSP IP/OBS SF/LOW 25: CPT | Performed by: STUDENT IN AN ORGANIZED HEALTH CARE EDUCATION/TRAINING PROGRAM

## 2022-01-14 RX ADMIN — DEXAMETHASONE 6 MG: 2 TABLET ORAL at 10:11

## 2022-01-14 RX ADMIN — INSULIN ASPART 2 UNITS: 100 INJECTION, SOLUTION INTRAVENOUS; SUBCUTANEOUS at 18:20

## 2022-01-14 RX ADMIN — ENOXAPARIN SODIUM 40 MG: 40 INJECTION SUBCUTANEOUS at 05:59

## 2022-01-14 ASSESSMENT — ACTIVITIES OF DAILY LIVING (ADL)
ADLS_ACUITY_SCORE: 6

## 2022-01-14 NOTE — PLAN OF CARE
Pertinent assessments: A&Ox4, independent in room. VSS on RA. Infrequent cough, LS dim, mild CORTÉS, denies SOB. Instructed on IS, tolerates well. BS normactive, BM today per pt. SBP 150s.  , 155, 196.    Major Shift Events: uneventful     Treatment Plan: Wean O2 as tolerated, decadron, lovenox . Hopeful to discharge 1/14 if off supplemental O2.    Bedside Nurse: Vonnie Tang RN

## 2022-01-14 NOTE — PROGRESS NOTES
[facility]    Medicine Progress Note - Hospitalist Service  Date of Admission: 1/11/2022     Assessment & Plan         Dustin Hudson is a 66 year old male with past medical history significant for type 2 diabetes mellitus, hypertension, chronic kidney disease stage 3, erectile dysfunction and chronic agnosia who presented to [facility] on 1/11/2022 with acute hypoxic respiratory failure 2/2 COVID-19 pneumonia.     Acute Hypoxic Respiratory Failure  COVID-19 Pneumonia  Patient was unvaccinated for COVID-19.  Developed symptoms on 1/1/2022 with positive test 1/4/2022.  Presented to care on 1/11/2022 with progressive shortness of breath and weakness at home.  Requiring supplemental oxygen up to 4 L.  Dexamethasone initiated on admission, however remdesivir was held in the setting of acute on chronic kidney dysfunction.  Was initially able to wean down to 0-0.5 L via nasal cannula on 11/13/2022, but over the course of 24 hours the patient has slowly been increasing his oxygen requirements.  He is now up to 2 L this morning.  We will continue to monitor over the course of the next day to assess for stability whether or not the patient would be able to discharge with oxygen.  -Dexamethasone 6mg x10 days total  -Holding Remdesivir  -Continuous oximetry  -Supplemental oxygen as needed  -SQH    Acute on Chronic Kidney Dysfunction, resolved  Hyperkalemia, resolved  Baseline creatinine 1.4-1.5.  Admitted with creatinine 1.8-1.9 and hyperkalemia to 5.4.  Suspected to be related to prerenal injury in the setting of poor oral intake prior to arrival.  Both are now downtrending after gentle fluid resuscitation.     Acute Leukopenia, resolved  Acute Thrombocytopenia, resolved  Bicytopenia related to COVID-19 infection, both of which have resolved for initiation of steroid therapy.    Hypertension  Losartan 100 mg PTA, held on admission due to renal dysfunction.  Will restart tomorrow if  "creatinine remains stable.    Type 2 Diabetes Mellitus: Continue SSI    Diet: Orders Placed This Encounter      Combination Diet Regular Diet Adult; Renal Diet  DVT Prophylaxis: Heparin SQ  Carter Catheter: Not present  Acute hypoxic respiratory failure  COVID-19 pneumonia code Status: Full Code    Expected discharge: Consider discharge tomorrow if oxygen needs remained stable over the next 24 hours.    The patient's care was discussed with the Bedside Nurse and Patient.    Wing Gonzales MD, S  Hospitalist Service  [facility]    Securely message with the Vocera Web Console (learn more here)  Text page via [employer] Paging/Directory    ______________________________________________________________________    Interval History   Nursing notes reviewed; no acute events overnight.  Patient reports that he is very tired this morning as he did not receive much sleep overnight due to continuous beeping of his oximeter.  He reported that his breathing is slightly better after the increasing amount of oxygen to 2 L this morning.  He denies any fevers, chills, nausea, vomiting, diarrhea.  He is eager to discharge home as soon as possible.    A full 10+ point review of systems was performed and found to be negative with the exception of those items noted here.    Physical Exam   Vital signs:  Temp: 98.9  F (37.2  C) Temp src: Oral BP: 112/72 Pulse: 83   Resp: 20 SpO2: 92 % O2 Device: Nasal cannula Oxygen Delivery: 2 LPM Height: 172.7 cm (5' 8\") Weight: 93.6 kg (206 lb 5.6 oz)  Estimated body mass index is 31.38 kg/m  as calculated from the following:    Height as of this encounter: 1.727 m (5' 8\").    Weight as of this encounter: 93.6 kg (206 lb 5.6 oz).    General: Very pleasant male resting comfortably in hospital bed.  Awake, alert, interactive.  HEENT: Normocephalic, atraumatic.  PERRL, EOMI.  Conjunctiva clear, sclerae anicteric.  Mucous membranes moist.  Cardiac: Regular rate and rhythm without murmur, " gallop, or rub.  No peripheral edema.  Respiratory: Normal work of breathing.  Nasal cannula in place.  Diffuse crackles throughout.  GI: Normal, active bowel sounds.  Abdomen soft, nontender, nondistended.  : Deferred.  Musculoskeletal: Moving all extremities appropriately.  Skin: No rashes or abrasions on exposed skin.  Neurologic: Alert and oriented x4.  Cranial nerves II through XII grossly intact.  Psychologic: Appropriate mood and affect.    Data   All laboratory results and other diagnostic data from the past 24 hours is available in Epic and has been personally reviewed.    Recent Labs   Lab 01/14/22  1346 01/14/22  0857 01/14/22  0824 01/13/22  1407 01/13/22  0850 01/12/22  1740 01/12/22  1341 01/12/22  0956 01/12/22  0842 01/12/22  0502 01/11/22  2344   WBC  --   --   --   --  6.7  --   --   --  1.7* 2.0* 2.4*   HGB  --   --   --   --  14.5  --   --   --  13.4 13.0* 14.4   MCV  --   --   --   --  93  --   --   --  95 95 99   PLT  --   --   --   --  199  199  --   --   --  116* 146* 124*   INR  --   --   --   --   --   --   --   --   --   --  1.06   NA  --  135  --   --  134  --  134  --  133  --  132*  131*   POTASSIUM  --  4.3  --   --  4.3  --  4.7  --  5.4*  5.4*  --  4.0  4.0   CHLORIDE  --  105  --   --  105  --  107  --  103  --  103  103   CO2  --  25  --   --  23  --  17*  --  21  --  20  20   BUN  --  36*  --   --  39*  --  40*  --  39*  --  37*  37*   CR  --  1.42*  --   --  1.50*  1.50*  --  1.65*  --  1.95*  --  1.84*  1.87*   ANIONGAP  --  5  --   --  6  --  10  --  9  --  9  8   JEZ  --  8.6  --   --  8.8  --  8.8  --  8.7  --  8.3*  8.2*   * 110* 112*   < > 166*   < > 242*   < > 217*  --  117*  117*   ALBUMIN  --  3.1*  --   --   --   --   --   --   --   --  3.4   PROTTOTAL  --  6.8  --   --   --   --   --   --   --   --  6.9   BILITOTAL  --  0.6  --   --   --   --   --   --   --   --  0.5   ALKPHOS  --  55  --   --   --   --   --   --   --   --  56   ALT  --  57  --    --   --   --   --   --   --   --  62   AST  --  37  --   --   --   --   --   --   --   --  49*    < > = values in this interval not displayed.     No results found for this or any previous visit (from the past 24 hour(s)).  I personally reviewed: no images or EKG's today.

## 2022-01-14 NOTE — PLAN OF CARE
End of Shift Summary  For vital signs and complete assessments, please see documentation flowsheets.     Pertinent assessments: A&Ox4, independent in room. VSS. 2L O2 sats at 93%.  Infrequent cough, LS dim, mild CORTÉS, denies SOB.    Major Shift Events: Uneventful     Treatment Plan: Wean O2 as tolerated, decadron, lovenox .

## 2022-01-14 NOTE — PLAN OF CARE
End of Shift Summary  For vital signs and complete assessments, please see documentation flowsheets.     Pertinent assessments: Remains on 2LPM oxygen via NC, unable to wean. Lungs diminished. Mild dyspnea on exertion. Infreuqent cough. Declines PRNs. Denies pain. Good appetite. Passing flatus, BM yesterday. Up independently in room.   Major Shift Events: None  Treatment Plan: PO Decadron. Lovenox. Weaning oxygen as able, currently unable to wean lower than 2LPM. Supportive cares.

## 2022-01-15 VITALS
RESPIRATION RATE: 18 BRPM | HEART RATE: 69 BPM | DIASTOLIC BLOOD PRESSURE: 81 MMHG | SYSTOLIC BLOOD PRESSURE: 129 MMHG | HEIGHT: 68 IN | TEMPERATURE: 97.9 F | BODY MASS INDEX: 31.27 KG/M2 | OXYGEN SATURATION: 94 % | WEIGHT: 206.35 LBS

## 2022-01-15 LAB
CREAT SERPL-MCNC: 1.36 MG/DL (ref 0.66–1.25)
CRP SERPL-MCNC: 14.6 MG/L (ref 0–8)
D DIMER PPP FEU-MCNC: 0.51 UG/ML FEU (ref 0–0.5)
GFR SERPL CREATININE-BSD FRML MDRD: 57 ML/MIN/1.73M2
GLUCOSE BLDC GLUCOMTR-MCNC: 107 MG/DL (ref 70–99)
GLUCOSE BLDC GLUCOMTR-MCNC: 162 MG/DL (ref 70–99)
GLUCOSE BLDC GLUCOMTR-MCNC: 192 MG/DL (ref 70–99)
PLATELET # BLD AUTO: 180 10E3/UL (ref 150–450)

## 2022-01-15 PROCEDURE — 85379 FIBRIN DEGRADATION QUANT: CPT | Performed by: INTERNAL MEDICINE

## 2022-01-15 PROCEDURE — 36415 COLL VENOUS BLD VENIPUNCTURE: CPT | Performed by: INTERNAL MEDICINE

## 2022-01-15 PROCEDURE — 250N000011 HC RX IP 250 OP 636: Performed by: INTERNAL MEDICINE

## 2022-01-15 PROCEDURE — 99238 HOSP IP/OBS DSCHRG MGMT 30/<: CPT | Performed by: STUDENT IN AN ORGANIZED HEALTH CARE EDUCATION/TRAINING PROGRAM

## 2022-01-15 PROCEDURE — 85049 AUTOMATED PLATELET COUNT: CPT | Performed by: INTERNAL MEDICINE

## 2022-01-15 PROCEDURE — 82565 ASSAY OF CREATININE: CPT | Performed by: INTERNAL MEDICINE

## 2022-01-15 PROCEDURE — 86140 C-REACTIVE PROTEIN: CPT | Performed by: INTERNAL MEDICINE

## 2022-01-15 RX ORDER — DEXAMETHASONE 6 MG/1
6 TABLET ORAL DAILY
Qty: 7 TABLET | Refills: 0 | Status: SHIPPED | OUTPATIENT
Start: 2022-01-16 | End: 2022-01-15

## 2022-01-15 RX ORDER — DEXAMETHASONE 6 MG/1
6 TABLET ORAL DAILY
Qty: 7 TABLET | Refills: 0 | Status: SHIPPED | OUTPATIENT
Start: 2022-01-16 | End: 2022-01-23

## 2022-01-15 RX ORDER — BENZONATATE 100 MG/1
100 CAPSULE ORAL 3 TIMES DAILY PRN
Qty: 30 CAPSULE | Refills: 0 | Status: SHIPPED | OUTPATIENT
Start: 2022-01-15 | End: 2022-01-15

## 2022-01-15 RX ORDER — BENZONATATE 100 MG/1
100 CAPSULE ORAL 3 TIMES DAILY PRN
Qty: 30 CAPSULE | Refills: 0 | Status: SHIPPED | OUTPATIENT
Start: 2022-01-15

## 2022-01-15 RX ADMIN — DEXAMETHASONE 6 MG: 2 TABLET ORAL at 08:52

## 2022-01-15 RX ADMIN — ENOXAPARIN SODIUM 40 MG: 40 INJECTION SUBCUTANEOUS at 06:13

## 2022-01-15 RX ADMIN — INSULIN ASPART 1 UNITS: 100 INJECTION, SOLUTION INTRAVENOUS; SUBCUTANEOUS at 14:49

## 2022-01-15 ASSESSMENT — ACTIVITIES OF DAILY LIVING (ADL)
ADLS_ACUITY_SCORE: 6

## 2022-01-15 NOTE — PLAN OF CARE
Pertinent assessments: A&Ox4. VSS, afebrile. Oxygen saturations stable on 2L NC. nfrequent cough, LS dim, mild CORTÉS. Denies pain. Up independently in room.     Major Shift Events: None    Treatment Plan: PO Decadron. Lovenox. Weaning oxygen as able, currently unable to wean lower than 2LPM. Supportive cares.

## 2022-01-15 NOTE — PLAN OF CARE
Patient adequate for discharge per MD. Discharging to home via wife. New medication instructions given to patient, verbalized understanding. Home O2 instructions given to patient, demonstrated and verbalized understanding. All belongings returned to patient.

## 2022-01-15 NOTE — PROGRESS NOTES
Patient has been assessed for Home Oxygen needs. Oxygen readings:    *Pulse oximetry (SpO2) =91 % on room air at rest while awake.    *SpO2 improved to 91% on 0liters/minute at rest.    *SpO2 = 87% on room air during activity/with exercise.    *SpO2 improved to 90% on 2liters/minute during activity/with exercise.

## 2022-01-15 NOTE — PLAN OF CARE
Pertinent assessments: On 2L O2 via NC. Infrequent cough, LS dim, mild CORTÉS. Up independently in room. Poor appetite.  Major Shift Events: None  Treatment Plan: PO Decadron. Lovenox. Weaning oxygen as able, currently unable to wean lower than 2LPM. Supportive cares.   Bedside Sybil Cleveland RN

## 2022-01-15 NOTE — DISCHARGE SUMMARY
[facility]  Hospitalist Discharge Summary      Date of Admission:  1/11/2022  Date of Discharge:  1/15/2022  Discharging Provider: Wing Gonzales MD      Discharge Diagnoses   Acute Hypoxic Respiratory Failure  COVID-19 Pneumonia  Acute on Chronic Kidney Dysfunction, resolved  Hyperkalemia, resolved  Acute Leukopenia, resolved  Acute Thrombocytopenia, resolved  Hypertension  Type 2 Diabetes Mellitus    Follow-ups Needed After Discharge   Follow-up Appointments     Follow-up and recommended labs and tests       Follow up with primary care provider, Maira Fernandez, within 7   days for hospital follow- up, assess for continued oxygen needs and repeat   kidney function tests.  The following labs/tests are recommended: BMP.           Unresulted Labs Ordered in the Past 30 Days of this Admission     Date and Time Order Name Status Description    1/11/2022 11:30 PM Blood Culture Peripheral Blood Preliminary     1/11/2022 11:30 PM Blood Culture Line, venous Preliminary       These results will be followed up by PCP    Discharge Disposition   Discharged to home  Condition at discharge: Stable    Hospital Course   Dustin Hudson is a 66 year old male with past medical history significant for type 2 diabetes mellitus, hypertension, chronic kidney disease stage 3, erectile dysfunction and chronic agnosia who presented to [facility] on 1/11/2022 with acute hypoxic respiratory failure 2/2 COVID-19 pneumonia. Patient was unvaccinated for COVID-19.  Developed symptoms on 1/1/2022 with positive test 1/4/2022.  Presented to care on 1/11/2022 with progressive shortness of breath and weakness at home.  Requiring supplemental oxygen up to 4 L.  Dexamethasone initiated on admission, however remdesivir was held in the setting of acute on chronic kidney dysfunction.Was able to wean oxygen to 2L via nasal cannula with movement. Patient has remained stable at this amount of oxygen for >24 hours  and is safe to discharge home with continued Dexamethasone, oxygen therapy and close follow up with PCP.       Consultations This Hospital Stay   ADVANCE DIRECTIVE IP CONSULT    Code Status   Full Code    Time Spent on this Encounter   I, Wing Gonzales MD, personally saw the patient today and spent less than or equal to 30 minutes discharging this patient.       Wing Gonzales MD  [place] [address] [facility]  [address]  [facility] [address]  Phone: [phone]  Fax: [phone]  ______________________________________________________________________    Physical Exam   Vital Signs: Temp: 97.9  F (36.6  C) Temp src: Oral BP: 129/81 Pulse: 69   Resp: 18 SpO2: 94 % O2 Device: Nasal cannula Oxygen Delivery: 2 LPM  Weight: 206 lbs 5.61 oz    General: Very pleasant male resting comfortably in hospital bed.  Awake, alert, interactive.  HEENT: Normocephalic, atraumatic.  PERRL, EOMI.  Conjunctiva clear, sclerae anicteric.  Mucous membranes moist.  Cardiac: Regular rate and rhythm without murmur, gallop, or rub.  No peripheral edema.  Respiratory: Normal work of breathing.  Nasal cannula in place.  Diffuse crackles throughout.  GI: Normal, active bowel sounds.  Abdomen soft, nontender, nondistended.  : Deferred.  Musculoskeletal: Moving all extremities appropriately.  Skin: No rashes or abrasions on exposed skin.  Neurologic: Alert and oriented x4.  Cranial nerves II through XII grossly intact.  Psychologic: Appropriate mood and affect.       Primary Care Physician   [facility]    Discharge Orders      Reason for your hospital stay    COVID-19 pneumonia     Follow-up and recommended labs and tests     Follow up with primary care provider, [facility], within 7 days for hospital follow- up, assess for continued oxygen needs and repeat kidney function tests.  The following labs/tests are recommended: BMP.     Activity    Your activity upon discharge: activity as tolerated      Discharge Instructions    You must isolate for 5 days after your symptoms have resolved     Monitor and record    Continue to monitor your pulse oximetry and record the numbers. Check 4-5 times daily, and if you have any symptoms that may indicate your oxygen is lower (lightheaded, dizzy, loss of conciousness, difficulty breathing).     Oxygen Adult/Peds    Oxygen Documentation:   I certify that this patient, Dustin Hudson has been under my care (or a nurse practitioner or physican's assistant working with me). This is the face-to-face encounter for oxygen medical necessity.      Dustin Hudson is now in a chronic stable state and continues to require supplemental oxygen. Patient has continued oxygen desaturation due to COVID-19 pneumonia.    Alternative treatment(s) tried or considered and deemed clinically infective for treatment of COVID-19 pneumonia include inhalers, steroids, and pulmonary toileting.  If portability is ordered, is the patient mobile within the home? yes    **Patients who qualify for home O2 coverage under the CMS guidelines require ABG tests or O2 sat readings obtained closest to, but no earlier than 2 days prior to the discharge, as evidence of the need for home oxygen therapy. Testing must be performed while patient is in the chronic stable state. See notes for O2 sats.**     Diet    Follow this diet upon discharge: Orders Placed This Encounter      Combination Diet Regular Diet Adult; Renal Diet       Significant Results and Procedures   Most Recent 3 CBC's:Recent Labs   Lab Test 01/15/22  0712 01/13/22  0850 01/12/22  0842 01/12/22  0502   WBC  --  6.7 1.7* 2.0*   HGB  --  14.5 13.4 13.0*   MCV  --  93 95 95    199  199 116* 146*     Most Recent 3 BMP's:Recent Labs   Lab Test 01/15/22  1430 01/15/22  0822 01/15/22  0712 01/15/22  0214 01/14/22  1346 01/14/22  0857 01/13/22  1407 01/13/22  0850 01/12/22  1740 01/12/22  1341   NA  --   --   --   --   --  135  --  134   --  134   POTASSIUM  --   --   --   --   --  4.3  --  4.3  --  4.7   CHLORIDE  --   --   --   --   --  105  --  105  --  107   CO2  --   --   --   --   --  25  --  23  --  17*   BUN  --   --   --   --   --  36*  --  39*  --  40*   CR  --   --  1.36*  --   --  1.42*  --  1.50*  1.50*  --  1.65*   ANIONGAP  --   --   --   --   --  5  --  6  --  10   JEZ  --   --   --   --   --  8.6  --  8.8  --  8.8   * 107*  --  192*   < > 110*   < > 166*   < > 242*    < > = values in this interval not displayed.      Results for orders placed or performed during the hospital encounter of 01/11/22   XR Chest Port 1 View    Narrative    EXAM: XR CHEST PORT 1 VIEW  LOCATION: [facility]  DATE/TIME: 1/11/2022 11:57 PM    INDICATION: suspected COVID v. influenza v. PNA. Shortness of breath and hypoxia. Cough and fever.  COMPARISON: None.      Impression    IMPRESSION: Mild patchy groundglass and interstitial opacities are present bilaterally suggesting pneumonia. Findings would be compatible with COVID. There is no dense localized consolidation. No pleural effusion. Heart size normal.   Head CT w/o contrast    Narrative    EXAM: CT HEAD W/O CONTRAST  LOCATION: [facility]  DATE/TIME: 1/12/2022 12:23 AM    INDICATION: Mental status change, unknown cause, confusion  COMPARISON: None.  TECHNIQUE: Routine CT Head without IV contrast. Multiplanar reformats. Dose reduction techniques were used.    FINDINGS:  INTRACRANIAL CONTENTS: No intracranial hemorrhage, extraaxial collection, or mass effect.  No CT evidence of acute infarct. Mild volume loss and presumed chronic small vessel ischemia.     VISUALIZED ORBITS/SINUSES/MASTOIDS: No intraorbital abnormality. Trace paranasal sinus mucosal disease. No middle ear or mastoid effusion.    BONES/SOFT TISSUES: No acute abnormality.      Impression    IMPRESSION:  1.  No acute intracranial abnormality.    2.  Age-related change.       Discharge  Medications   Current Discharge Medication List      START taking these medications    Details   benzonatate (TESSALON) 100 MG capsule Take 1 capsule (100 mg) by mouth 3 times daily as needed for cough  Qty: 30 capsule, Refills: 0    Associated Diagnoses: Pneumonia due to 2019 novel coronavirus      dexamethasone (DECADRON) 6 MG tablet Take 1 tablet (6 mg) by mouth daily for 7 days  Qty: 7 tablet, Refills: 0    Associated Diagnoses: Pneumonia due to 2019 novel coronavirus         CONTINUE these medications which have NOT CHANGED    Details   albuterol (PROAIR HFA/PROVENTIL HFA/VENTOLIN HFA) 108 (90 Base) MCG/ACT inhaler Inhale 1-2 puffs into the lungs 4 times daily as needed for shortness of breath / dyspnea      augmented betamethasone dipropionate (DIPROLENE-AF) 0.05 % external ointment Apply topically to affected area(s) 2 times daily.      betamethasone valerate (VALISONE) 0.1 % external lotion Apply topically to affected area(s) 2 times daily. Apply to scalp.      fish oil-omega-3 fatty acids 1000 MG capsule Take 2 g by mouth daily with food      sildenafil (VIAGRA) 100 MG tablet Take 1 tablet by mouth once daily if needed for Erectile Dysfunction. Take 30min to 4 hours before sexual activity. Max 100mg/24hr.      valACYclovir (VALTREX) 1000 mg tablet Take 2 tabs at onset of symptoms; repeat in 12 hours for a total of 4 tablets per outbreak.         STOP taking these medications       losartan (COZAAR) 100 MG tablet Comments:   Reason for Stopping:             Allergies   No Known Allergies

## 2022-01-15 NOTE — PROGRESS NOTES
Oxygen Documentation:   I certify that this patient, Dustin Hudson has been under my care (or a nurse practitioner or physican's assistant working with me). This is the face-to-face encounter for oxygen medical necessity.      Dustin Hudson is now in a chronic stable state and continues to require supplemental oxygen. Patient has continued oxygen desaturation due to COVID-19 pneumonia.    Alternative treatment(s) tried or considered and deemed clinically infective for treatment of COVID-19 pneumonia include inhalers, steroids, and pulmonary toileting.  If portability is ordered, is the patient mobile within the home? yes    **Patients who qualify for home O2 coverage under the CMS guidelines require ABG tests or O2 sat readings obtained closest to, but no earlier than 2 days prior to the discharge, as evidence of the need for home oxygen therapy. Testing must be performed while patient is in the chronic stable state. See notes for O2 sats.**            Wing Gonzales MD

## 2022-01-17 LAB
BACTERIA BLD CULT: NO GROWTH
BACTERIA BLD CULT: NO GROWTH

## 2022-02-19 ENCOUNTER — HEALTH MAINTENANCE LETTER (OUTPATIENT)
Age: 67
End: 2022-02-19

## 2022-04-16 ENCOUNTER — HEALTH MAINTENANCE LETTER (OUTPATIENT)
Age: 67
End: 2022-04-16

## 2022-08-06 ENCOUNTER — HEALTH MAINTENANCE LETTER (OUTPATIENT)
Age: 67
End: 2022-08-06

## 2022-10-22 ENCOUNTER — HEALTH MAINTENANCE LETTER (OUTPATIENT)
Age: 67
End: 2022-10-22

## 2022-12-04 ENCOUNTER — HEALTH MAINTENANCE LETTER (OUTPATIENT)
Age: 67
End: 2022-12-04

## 2023-04-01 ENCOUNTER — HEALTH MAINTENANCE LETTER (OUTPATIENT)
Age: 68
End: 2023-04-01

## 2023-08-27 ENCOUNTER — HEALTH MAINTENANCE LETTER (OUTPATIENT)
Age: 68
End: 2023-08-27

## 2023-11-05 ENCOUNTER — HEALTH MAINTENANCE LETTER (OUTPATIENT)
Age: 68
End: 2023-11-05

## 2023-11-08 NOTE — PHARMACY-ADMISSION MEDICATION HISTORY
Admission medication history interview status for this patient is complete. See [facility] admission navigator for allergy information, prior to admission medications and immunization status.     Medication history interview done, indicate source(s): Patient  Medication history resources (including written lists, pill bottles, clinic record):None  Pharmacy:     Changes made to PTA medication list:  Added: ALL    Actions taken by pharmacist (provider contacted, etc):None     Additional medication history information:None    Medication reconciliation/reorder completed by provider prior to medication history?  No        Prior to Admission medications    Medication Sig Last Dose Taking? Auth Provider   albuterol (PROAIR HFA/PROVENTIL HFA/VENTOLIN HFA) 108 (90 Base) MCG/ACT inhaler Inhale 1-2 puffs into the lungs 4 times daily as needed for shortness of breath / dyspnea 1/11/2022 at Unknown time Yes Unknown, Entered By History   augmented betamethasone dipropionate (DIPROLENE-AF) 0.05 % external ointment Apply topically to affected area(s) 2 times daily.  Yes Unknown, Entered By History   betamethasone valerate (VALISONE) 0.1 % external lotion Apply topically to affected area(s) 2 times daily. Apply to scalp.  Yes Unknown, Entered By History   fish oil-omega-3 fatty acids 1000 MG capsule Take 2 g by mouth daily with food 1/11/2022 at AM Yes Unknown, Entered By History   losartan (COZAAR) 100 MG tablet Take 100 mg by mouth daily 1/11/2022 at AM Yes Unknown, Entered By History   sildenafil (VIAGRA) 100 MG tablet Take 1 tablet by mouth once daily if needed for Erectile Dysfunction. Take 30min to 4 hours before sexual activity. Max 100mg/24hr.  Yes Unknown, Entered By History   valACYclovir (VALTREX) 1000 mg tablet Take 2 tabs at onset of symptoms; repeat in 12 hours for a total of 4 tablets per outbreak.  Yes Unknown, Entered By History          71

## 2024-01-14 ENCOUNTER — HEALTH MAINTENANCE LETTER (OUTPATIENT)
Age: 69
End: 2024-01-14

## 2024-06-02 ENCOUNTER — HEALTH MAINTENANCE LETTER (OUTPATIENT)
Age: 69
End: 2024-06-02

## 2024-10-20 ENCOUNTER — HEALTH MAINTENANCE LETTER (OUTPATIENT)
Age: 69
End: 2024-10-20

## 2024-12-22 ENCOUNTER — HEALTH MAINTENANCE LETTER (OUTPATIENT)
Age: 69
End: 2024-12-22